# Patient Record
Sex: FEMALE | Race: WHITE | NOT HISPANIC OR LATINO | Employment: OTHER | URBAN - METROPOLITAN AREA
[De-identification: names, ages, dates, MRNs, and addresses within clinical notes are randomized per-mention and may not be internally consistent; named-entity substitution may affect disease eponyms.]

---

## 2018-01-10 ENCOUNTER — HISTORICAL (OUTPATIENT)
Dept: RADIOLOGY | Facility: HOSPITAL | Age: 58
End: 2018-01-10

## 2018-04-02 ENCOUNTER — HISTORICAL (OUTPATIENT)
Dept: LAB | Facility: HOSPITAL | Age: 58
End: 2018-04-02

## 2018-04-06 ENCOUNTER — HISTORICAL (OUTPATIENT)
Dept: SURGERY | Facility: HOSPITAL | Age: 58
End: 2018-04-06

## 2018-06-27 ENCOUNTER — HISTORICAL (OUTPATIENT)
Dept: RADIOLOGY | Facility: HOSPITAL | Age: 58
End: 2018-06-27

## 2018-11-05 ENCOUNTER — HISTORICAL (OUTPATIENT)
Dept: RADIOLOGY | Facility: HOSPITAL | Age: 58
End: 2018-11-05

## 2019-02-07 ENCOUNTER — HISTORICAL (OUTPATIENT)
Dept: RADIOLOGY | Facility: HOSPITAL | Age: 59
End: 2019-02-07

## 2019-09-14 ENCOUNTER — HISTORICAL (OUTPATIENT)
Dept: ADMINISTRATIVE | Facility: HOSPITAL | Age: 59
End: 2019-09-14

## 2019-09-14 LAB
T4 FREE SERPL-MCNC: 0.6 NG/DL (ref 0.76–1.46)
TSH SERPL-ACNC: 8.63 MIU/L (ref 0.36–3.74)

## 2019-10-16 ENCOUNTER — HISTORICAL (OUTPATIENT)
Dept: INTERNAL MEDICINE | Facility: CLINIC | Age: 59
End: 2019-10-16

## 2019-10-16 LAB
ABS NEUT (OLG): 4.46 X10(3)/MCL (ref 2.1–9.2)
ALBUMIN SERPL-MCNC: 3.6 GM/DL (ref 3.4–5)
ALBUMIN/GLOB SERPL: 0.9 RATIO (ref 1.1–2)
ALP SERPL-CCNC: 99 UNIT/L (ref 45–117)
ALT SERPL-CCNC: 40 UNIT/L (ref 12–78)
AST SERPL-CCNC: 18 UNIT/L (ref 15–37)
BASOPHILS # BLD AUTO: 0.1 X10(3)/MCL (ref 0–0.2)
BASOPHILS NFR BLD AUTO: 1 %
BILIRUB SERPL-MCNC: 0.4 MG/DL (ref 0.2–1)
BILIRUBIN DIRECT+TOT PNL SERPL-MCNC: 0.1 MG/DL (ref 0–0.2)
BILIRUBIN DIRECT+TOT PNL SERPL-MCNC: 0.3 MG/DL
BUN SERPL-MCNC: 16 MG/DL (ref 7–18)
CALCIUM SERPL-MCNC: 9.4 MG/DL (ref 8.5–10.1)
CHLORIDE SERPL-SCNC: 108 MMOL/L (ref 98–107)
CHOLEST SERPL-MCNC: 262 MG/DL
CHOLEST/HDLC SERPL: 5 {RATIO} (ref 0–4.4)
CO2 SERPL-SCNC: 29 MMOL/L (ref 21–32)
CREAT SERPL-MCNC: 0.7 MG/DL (ref 0.6–1.3)
EOSINOPHIL # BLD AUTO: 0.1 X10(3)/MCL (ref 0–0.9)
EOSINOPHIL NFR BLD AUTO: 2 %
ERYTHROCYTE [DISTWIDTH] IN BLOOD BY AUTOMATED COUNT: 13.9 % (ref 11.5–14.5)
EST. AVERAGE GLUCOSE BLD GHB EST-MCNC: 120 MG/DL
GLOBULIN SER-MCNC: 3.9 GM/ML (ref 2.3–3.5)
GLUCOSE SERPL-MCNC: 85 MG/DL (ref 74–106)
HBA1C MFR BLD: 5.8 % (ref 4.2–6.3)
HCT VFR BLD AUTO: 48.1 % (ref 35–46)
HDLC SERPL-MCNC: 52 MG/DL (ref 40–59)
HGB BLD-MCNC: 15.3 GM/DL (ref 12–16)
IMM GRANULOCYTES # BLD AUTO: 0.02 10*3/UL
IMM GRANULOCYTES NFR BLD AUTO: 0 %
LDLC SERPL CALC-MCNC: 175 MG/DL
LYMPHOCYTES # BLD AUTO: 2.2 X10(3)/MCL (ref 0.6–4.6)
LYMPHOCYTES NFR BLD AUTO: 29 %
MCH RBC QN AUTO: 32.3 PG (ref 26–34)
MCHC RBC AUTO-ENTMCNC: 31.8 GM/DL (ref 31–37)
MCV RBC AUTO: 101.7 FL (ref 80–100)
MONOCYTES # BLD AUTO: 0.6 X10(3)/MCL (ref 0.1–1.3)
MONOCYTES NFR BLD AUTO: 8 %
NEUTROPHILS # BLD AUTO: 4.46 X10(3)/MCL (ref 2.1–9.2)
NEUTROPHILS NFR BLD AUTO: 60 %
PLATELET # BLD AUTO: 239 X10(3)/MCL (ref 130–400)
PMV BLD AUTO: 10.9 FL (ref 7.4–10.4)
POTASSIUM SERPL-SCNC: 4.4 MMOL/L (ref 3.5–5.1)
PROT SERPL-MCNC: 7.5 GM/DL (ref 6.4–8.2)
RBC # BLD AUTO: 4.73 X10(6)/MCL (ref 4–5.2)
SODIUM SERPL-SCNC: 139 MMOL/L (ref 136–145)
T3FREE SERPL-MCNC: 2.14 PG/ML (ref 2.18–3.98)
T4 FREE SERPL-MCNC: 0.73 NG/DL (ref 0.76–1.46)
TRIGL SERPL-MCNC: 175 MG/DL
TSH SERPL-ACNC: 5.11 MIU/L (ref 0.36–3.74)
VLDLC SERPL CALC-MCNC: 35 MG/DL
WBC # SPEC AUTO: 7.4 X10(3)/MCL (ref 4.5–11)

## 2019-11-11 ENCOUNTER — HISTORICAL (OUTPATIENT)
Dept: RADIOLOGY | Facility: HOSPITAL | Age: 59
End: 2019-11-11

## 2020-01-09 ENCOUNTER — HISTORICAL (OUTPATIENT)
Dept: RADIOLOGY | Facility: HOSPITAL | Age: 60
End: 2020-01-09

## 2020-03-02 ENCOUNTER — HISTORICAL (OUTPATIENT)
Dept: SURGERY | Facility: HOSPITAL | Age: 60
End: 2020-03-02

## 2021-01-14 ENCOUNTER — HISTORICAL (OUTPATIENT)
Dept: ADMINISTRATIVE | Facility: HOSPITAL | Age: 61
End: 2021-01-14

## 2021-01-14 LAB
EST. AVERAGE GLUCOSE BLD GHB EST-MCNC: 119.8 MG/DL
HBA1C MFR BLD: 5.8 %
T4 FREE SERPL-MCNC: 0.94 NG/DL (ref 0.7–1.48)

## 2021-02-05 LAB — HEMOCCULT STL QL IA: NEGATIVE

## 2021-05-26 LAB
HPV16+18+H RISK 12 DNA CVX-IMP: NEGATIVE
PAP RECOMMENDATION EXT: NORMAL
PAP SMEAR: NORMAL

## 2021-06-21 ENCOUNTER — HISTORICAL (OUTPATIENT)
Dept: RADIOLOGY | Facility: HOSPITAL | Age: 61
End: 2021-06-21

## 2021-07-15 ENCOUNTER — HISTORICAL (OUTPATIENT)
Dept: ADMINISTRATIVE | Facility: HOSPITAL | Age: 61
End: 2021-07-15

## 2021-07-15 LAB
ABS NEUT (OLG): 4.23 X10(3)/MCL (ref 2.1–9.2)
ALBUMIN SERPL-MCNC: 3.8 GM/DL (ref 3.4–4.8)
ALBUMIN/GLOB SERPL: 1.1 RATIO (ref 1.1–2)
ALP SERPL-CCNC: 100 UNIT/L (ref 40–150)
ALT SERPL-CCNC: 27 UNIT/L (ref 0–55)
AST SERPL-CCNC: 17 UNIT/L (ref 5–34)
BASOPHILS # BLD AUTO: 0.1 X10(3)/MCL (ref 0–0.2)
BASOPHILS NFR BLD AUTO: 1 %
BILIRUB SERPL-MCNC: 0.6 MG/DL
BILIRUBIN DIRECT+TOT PNL SERPL-MCNC: 0.2 MG/DL (ref 0–0.5)
BILIRUBIN DIRECT+TOT PNL SERPL-MCNC: 0.4 MG/DL (ref 0–0.8)
BUN SERPL-MCNC: 14.9 MG/DL (ref 9.8–20.1)
CALCIUM SERPL-MCNC: 10.2 MG/DL (ref 8.4–10.2)
CHLORIDE SERPL-SCNC: 104 MMOL/L (ref 98–107)
CHOLEST SERPL-MCNC: 217 MG/DL
CHOLEST/HDLC SERPL: 5 {RATIO} (ref 0–5)
CO2 SERPL-SCNC: 29 MMOL/L (ref 23–31)
CREAT SERPL-MCNC: 0.72 MG/DL (ref 0.55–1.02)
EOSINOPHIL # BLD AUTO: 0.1 X10(3)/MCL (ref 0–0.9)
EOSINOPHIL NFR BLD AUTO: 2 %
ERYTHROCYTE [DISTWIDTH] IN BLOOD BY AUTOMATED COUNT: 14.8 % (ref 11.5–14.5)
EST CREAT CLEARANCE SER (OHS): 68.42 ML/MIN
EST. AVERAGE GLUCOSE BLD GHB EST-MCNC: 119.8 MG/DL
GLOBULIN SER-MCNC: 3.5 GM/DL (ref 2.4–3.5)
GLUCOSE SERPL-MCNC: 90 MG/DL (ref 82–115)
HBA1C MFR BLD: 5.8 %
HCT VFR BLD AUTO: 52.6 % (ref 35–46)
HDLC SERPL-MCNC: 45 MG/DL (ref 35–60)
HGB BLD-MCNC: 16.6 GM/DL (ref 12–16)
IMM GRANULOCYTES # BLD AUTO: 0.04 10*3/UL
IMM GRANULOCYTES NFR BLD AUTO: 0 %
LDLC SERPL CALC-MCNC: 139 MG/DL (ref 50–140)
LYMPHOCYTES # BLD AUTO: 2.2 X10(3)/MCL (ref 0.6–4.6)
LYMPHOCYTES NFR BLD AUTO: 29 %
MCH RBC QN AUTO: 31.2 PG (ref 26–34)
MCHC RBC AUTO-ENTMCNC: 31.6 GM/DL (ref 31–37)
MCV RBC AUTO: 98.9 FL (ref 80–100)
MONOCYTES # BLD AUTO: 0.7 X10(3)/MCL (ref 0.1–1.3)
MONOCYTES NFR BLD AUTO: 10 %
NEUTROPHILS # BLD AUTO: 4.23 X10(3)/MCL (ref 2.1–9.2)
NEUTROPHILS NFR BLD AUTO: 58 %
NRBC BLD AUTO-RTO: 0 % (ref 0–0.2)
PLATELET # BLD AUTO: 252 X10(3)/MCL (ref 130–400)
PMV BLD AUTO: 10.7 FL (ref 7.4–10.4)
POTASSIUM SERPL-SCNC: 4.9 MMOL/L (ref 3.5–5.1)
PROT SERPL-MCNC: 7.3 GM/DL (ref 5.8–7.6)
RBC # BLD AUTO: 5.32 X10(6)/MCL (ref 4–5.2)
SODIUM SERPL-SCNC: 139 MMOL/L (ref 136–145)
T4 FREE SERPL-MCNC: 0.8 NG/DL (ref 0.7–1.48)
TRIGL SERPL-MCNC: 166 MG/DL (ref 37–140)
TSH SERPL-ACNC: 2.94 UIU/ML (ref 0.35–4.94)
VLDLC SERPL CALC-MCNC: 33 MG/DL
WBC # SPEC AUTO: 7.3 X10(3)/MCL (ref 4.5–11)

## 2021-12-22 ENCOUNTER — HISTORICAL (OUTPATIENT)
Dept: INTERNAL MEDICINE | Facility: CLINIC | Age: 61
End: 2021-12-22

## 2021-12-22 LAB
ABS NEUT (OLG): 4.94 X10(3)/MCL (ref 2.1–9.2)
ALBUMIN SERPL-MCNC: 3.8 GM/DL (ref 3.4–4.8)
ALBUMIN/GLOB SERPL: 1 RATIO (ref 1.1–2)
ALP SERPL-CCNC: 107 UNIT/L (ref 40–150)
ALT SERPL-CCNC: 39 UNIT/L (ref 0–55)
AST SERPL-CCNC: 22 UNIT/L (ref 5–34)
BASOPHILS # BLD AUTO: 0.1 X10(3)/MCL (ref 0–0.2)
BASOPHILS NFR BLD AUTO: 1 %
BILIRUB SERPL-MCNC: 0.4 MG/DL
BILIRUBIN DIRECT+TOT PNL SERPL-MCNC: 0.1 MG/DL (ref 0–0.5)
BILIRUBIN DIRECT+TOT PNL SERPL-MCNC: 0.3 MG/DL (ref 0–0.8)
BUN SERPL-MCNC: 19.2 MG/DL (ref 9.8–20.1)
CALCIUM SERPL-MCNC: 10.4 MG/DL (ref 8.7–10.5)
CHLORIDE SERPL-SCNC: 103 MMOL/L (ref 98–107)
CHOLEST SERPL-MCNC: 255 MG/DL
CHOLEST/HDLC SERPL: 5 {RATIO} (ref 0–5)
CO2 SERPL-SCNC: 31 MMOL/L (ref 23–31)
CREAT SERPL-MCNC: 0.78 MG/DL (ref 0.55–1.02)
EOSINOPHIL # BLD AUTO: 0.2 X10(3)/MCL (ref 0–0.9)
EOSINOPHIL NFR BLD AUTO: 2 %
ERYTHROCYTE [DISTWIDTH] IN BLOOD BY AUTOMATED COUNT: 14.6 % (ref 11.5–14.5)
GLOBULIN SER-MCNC: 4 GM/DL (ref 2.4–3.5)
GLUCOSE SERPL-MCNC: 97 MG/DL (ref 82–115)
HCT VFR BLD AUTO: 50.7 % (ref 35–46)
HDLC SERPL-MCNC: 55 MG/DL (ref 35–60)
HGB BLD-MCNC: 16.1 GM/DL (ref 12–16)
IMM GRANULOCYTES # BLD AUTO: 0.03 10*3/UL
IMM GRANULOCYTES NFR BLD AUTO: 0 %
LDLC SERPL CALC-MCNC: 170 MG/DL (ref 50–140)
LYMPHOCYTES # BLD AUTO: 2.4 X10(3)/MCL (ref 0.6–4.6)
LYMPHOCYTES NFR BLD AUTO: 28 %
MCH RBC QN AUTO: 32 PG (ref 26–34)
MCHC RBC AUTO-ENTMCNC: 31.8 GM/DL (ref 31–37)
MCV RBC AUTO: 100.8 FL (ref 80–100)
MONOCYTES # BLD AUTO: 0.9 X10(3)/MCL (ref 0.1–1.3)
MONOCYTES NFR BLD AUTO: 11 %
NEUTROPHILS # BLD AUTO: 4.94 X10(3)/MCL (ref 2.1–9.2)
NEUTROPHILS NFR BLD AUTO: 58 %
NRBC BLD AUTO-RTO: 0 % (ref 0–0.2)
PLATELET # BLD AUTO: 260 X10(3)/MCL (ref 130–400)
PMV BLD AUTO: 10.7 FL (ref 7.4–10.4)
POTASSIUM SERPL-SCNC: 5.1 MMOL/L (ref 3.5–5.1)
PROT SERPL-MCNC: 7.8 GM/DL (ref 5.8–7.6)
RBC # BLD AUTO: 5.03 X10(6)/MCL (ref 4–5.2)
SODIUM SERPL-SCNC: 140 MMOL/L (ref 136–145)
TRIGL SERPL-MCNC: 151 MG/DL (ref 37–140)
TSH SERPL-ACNC: 8.84 UIU/ML (ref 0.35–4.94)
VLDLC SERPL CALC-MCNC: 30 MG/DL
WBC # SPEC AUTO: 8.5 X10(3)/MCL (ref 4.5–11)

## 2022-01-13 ENCOUNTER — HISTORICAL (OUTPATIENT)
Dept: RADIOLOGY | Facility: HOSPITAL | Age: 62
End: 2022-01-13

## 2022-04-09 ENCOUNTER — HISTORICAL (OUTPATIENT)
Dept: ADMINISTRATIVE | Facility: HOSPITAL | Age: 62
End: 2022-04-09
Payer: MEDICAID

## 2022-04-26 VITALS
BODY MASS INDEX: 44.04 KG/M2 | DIASTOLIC BLOOD PRESSURE: 80 MMHG | HEIGHT: 64 IN | WEIGHT: 257.94 LBS | OXYGEN SATURATION: 94 % | SYSTOLIC BLOOD PRESSURE: 127 MMHG

## 2022-04-30 NOTE — OP NOTE
DATE OF SURGERY:    04/06/2018    SURGEON:  Pramod Moe MD    PREOPERATIVE DIAGNOSIS:  Left knee lateral meniscus tear.    POSTOPERATIVE DIAGNOSIS:  Left knee lateral meniscus tear.    PROCEDURES:    1. Left knee arthroscopy with lateral meniscectomy.  2. Arthroscopic loose body removal, left knee.    ASSISTANT:  Basilio Ramirez CST    ANESTHESIA:  LMA.    IV FLUIDS:  As per Anesthesia.    ESTIMATED BLOOD LOSS:  Less than 5 mL.    INSTRUMENT COUNT:  Counts are correct.    COMPLICATIONS:  None.    DISPOSITION:  Stable to PACU.    INDICATIONS:  Ms. Weber is a 57-year-old female with continued pain and loss of motion of left knee.  She has failed multiple conservative treatments.  She has been followed in my clinic.  Risks, benefits and alternatives were discussed with the patient in detail.  The risks include but are not limited to pain, bleeding, infection, damage to blood vessels or nerves, heart attack, stroke, death, need for operation, continued pain, continued loss of motion, retear, post-traumatic arthritis, need for additional surgery.  The patient understood these risks and wanted to proceed with surgical intervention.  Informed consent was obtained.    PROCEDURE IN DETAIL:  The patient was found in preoperative holding by Anesthesia and found fit for surgery.  She was taken to the operating room and placed on the operating table in supine position.  All bony prominences were well padded.  Timeout was called to identify correct patient, correct procedure and correct site, and all were in agreement.  The patient underwent LMA anesthesia without complications.  She was then prepped and draped in normal sterile fashion leaving the left lower extremity exposed for surgery.  Well padded tourniquet was placed on the left upper thigh.  Approximate tourniquet time was 20 minutes at 300.  She received preoperative antibiotics.  After exsanguination of the left lower extremity, tourniquet was inflated.   Anterolateral portal was then made through 1 cm incision.  Camera was introduced in suprapatellar pouch.  Next, medial and lateral gutters were inspected with no loose bodies.  Patella was tracking appropriately.  She did have some mild changes underneath the patella.  Camera was introduced in the medial compartment.  Anteromedial portal was made through a 1 cm incision.  Next, probing of medial meniscus demonstrated no obvious tears.  She did have loose bodies in the medial compartment.  She had unstable cartilage of the medial femoral condyle.  She did have some exposed bone.  With the use of 3.5 shaver, the patient underwent arthroscopic loose body removal.  She also had debridement of the unstable cartilage of the medial femoral condyle.  This was taken back to stable cartilage.  After this was done, the camera was introduced in the intercondylar notch, where ACL and PCL were inspected and no additional loose bodies.  ACL and PCL were intact.  Camera was introduced in lateral compartment.  The patient had unstable anterior horn lateral meniscus tear to probing.  With the use of 3.5 shaver, the patient underwent a lateral meniscectomy of the anterior horn and body.  She tolerated this very well.  The remaining lateral meniscus was probed and found to be stable without any signs of additional tear or instability.  She had minimal arthritic change in the lateral compartment.  Camera was introduced back in suprapatellar pouch.  All excess fluid was removed.  Tourniquet was released.  Hemostasis was achieved.  Copious irrigation was used to wash the two incisions.  Incision was then closed with 2-0 nylon suture in standard horizontal mattress configuration.  Xeroform, 4x4s, soft tissue dressing and Ace wrap was placed on the left lower extremity.  She was then awakened by anesthesia and brought to PACU in stable condition.        ______________________________  MD BRITTANIE Galeana/JESSI  DD:  04/11/2018  Time:   04:47PM  DT:  04/12/2018  Time:  12:11PM  Job #:  [id number]

## 2022-05-02 NOTE — HISTORICAL [facility] CERNER
This is a historical note converted from Liliam. Formatting and pictures may have been removed.  Please reference Liliam for original formatting and attached multimedia. Chief Complaint  check up  History of Present Illness  61 yo mo copdarthritis hypothyroidhx edema hx breast ca chol tob use intertrigo  Review of Systems  neg cv, neg pulm, neg gi gu ros as in hpi  Physical Exam  Vitals & Measurements  T:?36.6? ?C (Oral)? HR:?89(Peripheral)? RR:?20? BP:?119/73?  HT:?162.00?cm? WT:?116.400?kg? BMI:?44.35?  aax4 nad  eliu eomi  cv rrr s1s2 no mgr  lungs cta no cr or whz  abd nt soft  ext no edema  neuro intact  Assessment/Plan  1.?Breast cancer?C50.919  ?remission  ?  2.?COPD - Chronic obstructive pulmonary disease?J44.9  ?stable  ?  3.?Hypothyroidism?E03.9  ?tft  ?  4.?Hypertension?I10  ?controlled  Ordered:  CBC w/ Auto Diff, Routine collect, *Est. 01/14/22 3:00:00 CST, Blood, Order for future visit, *Est. Stop date 01/14/22 3:00:00 CST, Lab Collect, Hypertension, 01/14/21 9:05:00 CST  Clinic Follow up, *Est. 07/14/21 3:00:00 CDT, Order for future visit, Hypertension, [facility]  Comprehensive Metabolic Panel, Routine collect, *Est. 01/14/22 3:00:00 CST, Blood, Order for future visit, *Est. Stop date 01/14/22 3:00:00 CST, Lab Collect, Hypertension, 01/14/21 9:05:00 CST  Free T4, Routine collect, 01/14/21 9:05:00 CST, Blood, Order for future visit, Stop date 01/14/21 9:05:00 CST, Lab Collect, Hypertension, 01/14/21 9:05:00 CST  Hemoglobin A1C [facility], Routine collect, 01/14/21 9:05:00 CST, Blood, Order for future visit, Stop date 01/14/21 9:05:00 CST, Lab Collect, Hypertension, 01/14/21 9:05:00 CST  Lipid Panel, Routine collect, *Est. 01/14/22 3:00:00 CST, Blood, Order for future visit, *Est. Stop date 01/14/22 3:00:00 CST, Lab Collect, Hypertension, 01/14/21 9:05:00 CST  Occult Blood Fecal Immunoassay, Routine collect, Stool, Order for future visit, 01/14/21 9:05:00 CST, Stop date 01/14/21 9:05:00 CST, Nurse collect,  Hypertension  Office/Outpatient Visit Level 4 Established [id number] PC, Hypertension, [facility], 01/14/21 9:04:00 CST  Thyroid Stimulating Hormone, Routine collect, *Est. 01/14/22 3:00:00 CST, Blood, Order for future visit, *Est. Stop date 01/14/22 3:00:00 CST, Lab Collect, Hypertension, 01/14/21 9:05:00 CST  ?  5.?Tobacco user?Z72.0  ?advised to quit  ?  Orders:  nicotine, = 1 EA, TD, Daily, # 21 EA, 0 Refill(s), Pharmacy: [facility], 162, cm, Height/Length Dosing, 01/14/21 8:41:00 CST, 116.4, kg, Weight Dosing, 01/14/21 8:41:00 CST  Echocardiogram Complete Adult, *Est. 01/21/21 3:00:00 CST, Routine, Edema, *Est. Stop date 01/21/21 3:00:00 CST, Ambulatory, Patient has IV, Standard Precautions, [facility], Order for future visit, Edema  Referrals  [facility] Internal Referral to Gynecology Clinic, Specialty: Gynecology, Reason: pap, Start: 01/14/21 9:05:00 CST, Service By: 01/15/21  Clinic Follow up, *Est. 07/14/21 3:00:00 CDT, Order for future visit, Hypertension, [facility]   Problem List/Past Medical History  Ongoing  Arthritis  Breast cancer  Claustrophobia  COPD - Chronic obstructive pulmonary disease  History of bronchitis  History of pneumonia  Hypertension  Hypothyroidism  Panic attack  Sleep apnea  Tobacco user  Torn lateral meniscus  Venous insufficiency  Historical  Breast cancer  Cervical dysplasia  Rib injury  Procedure/Surgical History  Biopsy, breast, with placement of breast localization device(s) (eg, clip, metallic pellet), when performed, and imaging of the biopsy specimen, when performed, percutaneous; first lesion, including stereotactic guidance (03/02/2020)  Drainage of Left Breast, Percutaneous Approach, Diagnostic (03/02/2020)  Arthroscopy Knee W/Menisectomy (Left) (04/06/2018)  Arthroscopy, knee, surgical; with meniscectomy (medial OR lateral, including any meniscal shaving) including debridement/shaving of articular cartilage (chondroplasty), same or  separate compartment(s), when performed (04/06/2018)  Excision of Left Knee Joint, Percutaneous Endoscopic Approach (04/06/2018)  Extirpation of Matter from Left Knee Joint, Percutaneous Endoscopic Approach (04/06/2018)  breast reconstruction  Mastectomy  removal of breast implant  vocal cord polyp removed   Medications  Advair Diskus 250 mcg-50 mcg inhalation powder, 1 inh, INH, BID, 11 refills  atorvastatin 20 mg oral tablet, 20 mg= 1 tab(s), Oral, Daily, 11 refills  buprenorphine 5 mcg/hr transdermal film, extended release  celecoxib 200 mg oral capsule, 200 mg= 1 cap(s), Oral, Daily  Chantix,? ?Not taking: Last Dose Date/Time Unknown  Chantix Continuing Month 1 mg oral tablet, 1 mg= 1 tab(s), Oral, BID,? ?Not taking: Last Dose Date/Time unknown  Chantix Starter Pack 0.5 mg-1 mg oral tablet,? ?Not taking: Last Dose Date/Time unknown  cpap mask hose filter headgear, See Instructions  furosemide 40 mg oral tablet, 40 mg= 1 tab(s), Oral, Daily, 11 refills  levothyroxine 50 mcg (0.05 mg) oral tablet, 50 mcg= 1 tab(s), Oral, Daily  levothyroxine 75 mcg (0.075 mg) oral tablet, 75 mcg= 1 tab(s), Oral, Daily, 3 refills  Nicoderm C-Q 14 mg/24 hr transdermal film, extended release, 1 EA, TD, Daily  Nicoderm C-Q Clear 21 mg/24 hr transdermal film, extended release, 1 EA, TD, Daily  Norvasc 2.5 mg oral tablet, 2.5 mg= 1 tab(s), Oral, Daily, 3 refills  ProAir HFA 90 mcg/inh inhalation aerosol with adapter, 2 puff(s), INH, q6hr, PRN, 11 refills  Spiriva HandiHaler 18 mcg inhalation capsule, 18 mcg= 1 cap(s), INH, Daily, 11 refills  traMADol 50 mg oral tablet, 50 mg= 1 tab(s), Oral, q8hr, PRN  Allergies  Demerol HCl?(nausea)  Dilaudid?(sob, smothering sensation)  Percodan?(palpitations)  codeine?(unknown, childhood)  methadone?(sob, tachycardia, methadone)  morphine?(sob, tachycardia, morphine)  sulfamethoxazole?(unknown, childhood allergy)  Social History  Abuse/Neglect  No, No, Yes, 01/14/2021  Alcohol  Current, Beer,  04/03/2018  Employment/School  Employed, 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 Living situation: Home/Independent., 08/21/2015  Sexual  Sexually active: No., 08/21/2015  Substance Use  Never, 08/21/2015  Tobacco  10 or more cigarettes (1/2 pack or more)/day in last 30 days, Yes, 01/14/2021  Family History  CAD (coronary artery disease): Father.  Diabetes mellitus type 2: Mother and Father.  Heart attack: Father.  Hypertension.: Mother, Father and Brother.  Primary malignant neoplasm of lung: Father.  Renal disease: Mother.  Stroke: Grandfather.  Venous insufficiency: Father.  Immunizations  Vaccine Date Status   pneumococcal 23-polyvalent vaccine 10/21/2017 Given   Health Maintenance  Health Maintenance  ???Pending?(in the next year)  ??? ??OverDue  ??? ? ? ?Aspirin Therapy for CVD Prevention due??10/25/19??and every 1??year(s)  ??? ? ? ?COPD Management-Oxygen Assessment due??08/15/20??and every 1??year(s)  ??? ? ? ?Influenza Vaccine due??10/01/20??and every 1??day(s)  ??? ? ? ?Colorectal Screening due??10/15/20??and every 1??year(s)  ??? ? ? ?Diabetes Screening due??10/15/20??and every 1??year(s)  ??? ? ? ?Hypertension Management-BMP due??10/15/20??and every 1??year(s)  ??? ? ? ?Smoking Cessation due??01/01/21??and every 1??year(s)  ??? ??Due?  ??? ? ? ?Alcohol Misuse Screening due??01/02/21??and every 1??year(s)  ??? ? ? ?Cervical Cancer Screening due??01/14/21??and every 3??year(s)  ??? ? ? ?COPD Maintenance-Pulmonary Rehab Education due??01/14/21??and every 1??year(s)  ??? ? ? ?COPD Maintenance-Spirometry due??01/14/21??Unknown Frequency  ??? ? ? ?Hypertension Management-Education due??01/14/21??and every 1??year(s)  ??? ? ? ?Lung Cancer Screening due??01/14/21??and every 1??year(s)  ??? ? ? ?Tetanus Vaccine due??01/14/21??and every 10??year(s)  ??? ? ? ?Zoster Vaccine due??01/14/21??Unknown Frequency  ??? ??Due In Future?  ??? ? ? ?COPD Management-COPD Medications  Prescribed not due until??05/28/21??and every 1??year(s)  ??? ? ? ?Obesity Screening not due until??01/01/22??and every 1??year(s)  ??? ? ? ?Breast Cancer Screening not due until??01/08/22??and every 2??year(s)  ???Satisfied?(in the past 1 year)  ??? ??Satisfied?  ??? ? ? ?ADL Screening on??01/14/21.??Satisfied by Barrientos LPDERECK, Pravin Aerial  ??? ? ? ?Blood Pressure Screening on??01/14/21.??Satisfied by Barrientos LPDERECK, Pravin Aerial  ??? ? ? ?Body Mass Index Check on??01/14/21.??Satisfied by Barrientos LPDERECK, Pravin Aerial  ??? ? ? ?COPD Management-COPD Medications Prescribed on??05/28/20.??Satisfied by Noemí Schmitt MD  ??? ? ? ?Depression Screening on??01/14/21.??Satisfied by Barrientos LPDERECK, Pravin Aerial  ??? ? ? ?Hypertension Management-Blood Pressure on??01/14/21.??Satisfied by Barrientos LPDERECK, Pravin Aerial  ??? ? ? ?Influenza Vaccine on??01/14/21.??Satisfied by Barrientos LPDERECK, Pravin Aerial  ??? ? ? ?Obesity Screening on??01/14/21.??Satisfied by Barrientos LPN, Pravin Aerial  ?

## 2022-05-02 NOTE — HISTORICAL [facility] CERNER
This is a historical note converted from Liliam. Formatting and pictures may have been removed.  Please reference Liliam for original formatting and attached multimedia. Chief Complaint  Medication Management  History of Present Illness  59-year-old female presents to Urgent care requesting multiple medication refills for Hypertension, COPD, Hypothyroidism. ?Patient was last seen in urgent care back in May?for same request; patient did not keep?appointment with internal medicine this summer. Patient is rescheduled to establish primary care?with internal medicine on?10/15/19. Patient is requesting refills for Norvasc, Advair, Pro air, levothyroxine, and Lasix. ?She states she?has been out of most of these medications?for over a month. However, looking at Dr Diaz she only received 30 days of levothyroxine in May which means she has been out of this medication for much longer.  ?  Increased pitting edema to BLE x 2 days. No increased shortness of breath; no chest pain.  Patient is a known smoker. Patient is scheduled with [facility] on 10/15/19 to establish PCP.  Review of Systems  GENERAL: Negative except as stated?in HPI  CV: Negative except as stated in HPI  RESP: Negative except as stated?in HPI  GI: Negative?except as stated in?HPI  : Negative?except as stated in?HPI  SKIN: Negative?except as stated in?HPI  Neuro: Negative?except as stated in?HPI  MS: Negative?except as stated in?HPI  Psych: Negative?except as stated in?HPI  Physical Exam  Vitals & Measurements  T:?36.7? ?C (Oral)? HR:?82(Peripheral)? BP:?126/87?  HT:?163?cm? WT:?86.1?kg? BMI:?32.41?  Vital Signs reviewed  GENERAL: Alert and oriented; no acute distress. Obese.  Head: Normocephalic, atraumatic.  Neck: Supple, nontender.? Full ROM. No carotid bruits or JVD. No palpable adenopathy. No thyroid enlargement or nodules noted.  CV: Normal rate and rhythm. No murmurs, rubs or gallops. ?No JVD or carotid bruit noted. No pitting edema to extremities or calf  tenderness. Normal peripheral pulses and perfusion.  RESP: Respirations even and non labored. ?Expiratory wheezing and rhonchi noted?throughout lung fields; delayed expiratory phase noted.  NEURO: Awake, alert and oriented to person, place, time and situation. Cranial nerves II-XII grossly intact.?No focal or sensory deficits noted.  INTEGUMENTARY: Skin warm, dry, and intact. No rashes or?unusual bruising; no pallor.  PSYCH: Appropriate mood and affect; cooperative.  Assessment/Plan  1.?Hypertension?I10,?Hypertension?I10  Refilled Norvasc?as previously prescribed. ?Keep scheduled appointment with internal medicine in October. ?Low-sodium diet.?ER precautions.  Ordered:  amLODIPine, 2.5 mg = 1 tab(s), Oral, Daily, # 30 tab(s), 1 Refill(s), Pharmacy: [facility]  fluticasone-salmeterol, 1 inh, INH, BID, rinse mouth and throat after use, # 60 cap(s), 1 Refill(s), Pharmacy: [facility]  After Hrs Visit [id number] PC, Hypertension  Hypothyroidism  COPD - Chronic obstructive pulmonary disease  Tobacco user, 09/14/19 11:18:00 CDT  Office/Outpatient Visit Level 4 Established [id number] PC, Hypertension  Hypothyroidism  COPD - Chronic obstructive pulmonary disease  Tobacco user, 09/14/19 11:18:00 CDT  ?  2.?Hypothyroidism?E03.9,?Hypothyroidism?E03.9  TSH and Free T4?collected for baseline only. ?Stressed the importance?of medication compliance with patient.?Stressed the importance of taking Synthroid daily on an empty stomach with a full glass of water; avoid taking other medications at the same time. ?Keep scheduled appointment with internal medicine clinic in October. ?Follow-up as needed; ER precautions discussed.  Ordered:  fluticasone-salmeterol, 1 inh, INH, BID, rinse mouth and throat after use, # 60 cap(s), 1 Refill(s), Pharmacy: [facility]  After Hrs Visit [id number] PC, Hypertension  Hypothyroidism  COPD - Chronic obstructive pulmonary disease  Tobacco user, 09/14/19 11:18:00 CDT  Free  T4, Routine collect, 09/14/19 11:20:00 CDT, Blood, Collected, Stop date 09/14/19 11:20:00 CDT, Nurse collect, Hypothyroidism, 09/14/19 11:20:00 CDT  Office/Outpatient Visit Level 4 Established [id number] PC, Hypertension  Hypothyroidism  COPD - Chronic obstructive pulmonary disease  Tobacco user, 09/14/19 11:18:00 CDT  Thyroid Stimulating Hormone, Routine collect, 09/14/19 11:20:00 CDT, Blood, Collected, Stop date 09/14/19 11:20:00 CDT, Nurse collect, Hypothyroidism, 09/14/19 11:20:00 CDT  ?  3.?COPD - Chronic obstructive pulmonary disease?J44.9,?COPD - Chronic obstructive pulmonary disease?J44.9  Refilled Advair and pro-air as previously prescribed. ?I stressed the importance of smoking cessation. ?Keep appointment with internal medicine clinic in October as scheduled. ?ER precautions for any respiratory distress or worsening in condition.  Ordered:  fluticasone-salmeterol, 1 inh, INH, BID, rinse mouth and throat after use, # 60 cap(s), 1 Refill(s), Pharmacy: [facility]  After Hrs Visit [id number] PC, Hypertension  Hypothyroidism  COPD - Chronic obstructive pulmonary disease  Tobacco user, 09/14/19 11:18:00 CDT  Office/Outpatient Visit Level 4 Established [id number] PC, Hypertension  Hypothyroidism  COPD - Chronic obstructive pulmonary disease  Tobacco user, 09/14/19 11:18:00 CDT  ?  4.?Tobacco user?Z72.0  Smoking cessation education at discharge.  Ordered:  After Hrs Visit [id number] PC, Hypertension  Hypothyroidism  COPD - Chronic obstructive pulmonary disease  Tobacco user, 09/14/19 11:18:00 CDT  Office/Outpatient Visit Level 4 Established [id number] PC, Hypertension  Hypothyroidism  COPD - Chronic obstructive pulmonary disease  Tobacco user, 09/14/19 11:18:00 CDT  ?  Orders:  furosemide, 40 mg = 1 tab(s), Oral, Daily, every morning, # 30 tab(s), 0 Refill(s), Pharmacy: [facility]  levothyroxine, 50 mcg = 1 tab(s), Oral, Daily, on empty stomach with glass of water., # 30 tab(s), 0 Refill(s),  Pharmacy: [facility]  Routine Spec Collect Venipuncture - Lab blood collect, 09/14/19 11:22:00 CDT, 09/14/19 11:22:00 CDT   Problem List/Past Medical History  Ongoing  Arthritis  Breast cancer  Claustrophobia  COPD - Chronic obstructive pulmonary disease  History of bronchitis  History of pneumonia  Hypertension  Hypothyroidism  Panic attack  Sleep apnea  Tobacco user  Torn lateral meniscus  Venous insufficiency  Historical  Breast cancer  Cervical dysplasia  Rib injury  Procedure/Surgical History  Arthroscopy Knee W/Menisectomy (Left) (04/06/2018)  Arthroscopy, knee, surgical; with meniscectomy (medial OR lateral, including any meniscal shaving) including debridement/shaving of articular cartilage (chondroplasty), same or separate compartment(s), when performed (04/06/2018)  Excision of Left Knee Joint, Percutaneous Endoscopic Approach (04/06/2018)  Extirpation of Matter from Left Knee Joint, Percutaneous Endoscopic Approach (04/06/2018)  breast reconstruction  Mastectomy  removal of breast implant  vocal cord polyp removed   Medications  Advair Diskus 250 mcg-50 mcg inhalation powder, 1 inh, INH, BID, 1 refills  buprenorphine 5 mcg/hr transdermal film, extended release  celecoxib 200 mg oral capsule, 200 mg= 1 cap(s), Oral, Daily  Chantix  furosemide 40 mg oral tablet, 40 mg= 1 tab(s), Oral, Daily  levothyroxine 50 mcg (0.05 mg) oral tablet, 50 mcg= 1 tab(s), Oral, Daily  Norvasc 2.5 mg oral tablet, 2.5 mg= 1 tab(s), Oral, Daily, 1 refills  ProAir HFA 90 mcg/inh inhalation aerosol with adapter, 2 puff(s), INH, q6hr, PRN  traMADol 50 mg oral tablet, 50 mg= 1 tab(s), Oral, q8hr, PRN  Allergies  Demerol HCl?(nausea)  Dilaudid?(sob, smothering sensation)  Percodan?(palpitations)  codeine?(unknown, childhood)  methadone?(sob, tachycardia, methadone)  morphine?(sob, tachycardia, morphine)  sulfamethoxazole?(unknown, childhood allergy)  Social History  Abuse/Neglect  No, 09/14/2019  Alcohol  Current, Beer,  04/03/2018  Employment/School  Employed, 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 Living situation: Home/Independent., 08/21/2015  Sexual  Sexually active: No., 08/21/2015  Substance Use  Never, 08/21/2015  Tobacco  10 or more cigarettes (1/2 pack or more)/day in last 30 days, No, 09/14/2019  Family History  CAD (coronary artery disease): Father.  Diabetes mellitus type 2: Mother and Father.  Heart attack: Father.  Hypertension.: Mother, Father and Brother.  Primary malignant neoplasm of lung: Father.  Renal disease: Mother.  Stroke: Grandfather.  Venous insufficiency: Father.  Immunizations  Vaccine Date Status   pneumococcal 23-polyvalent vaccine 10/21/2017 Given   Health Maintenance  Health Maintenance  ???Pending?(in the next year)  ??? ??OverDue  ??? ? ? ?Diabetes Screening due??and every?  ??? ? ? ?Cervical Cancer Screening due??01/31/10??and every 3??year(s)  ??? ? ? ?Alcohol Misuse Screening due??01/01/19??and every 1??year(s)  ??? ? ? ?Smoking Cessation due??01/01/19??and every 1??year(s)  ??? ??Due?  ??? ? ? ?Breast Cancer Screening due??09/14/19??and every?  ??? ? ? ?COPD Maintenance-Pulmonary Rehab Education due??09/14/19??and every 1??year(s)  ??? ? ? ?COPD Maintenance-Spirometry due??09/14/19??and every?  ??? ? ? ?Colorectal Screening due??09/14/19??and every?  ??? ? ? ?Hypertension Management-Education due??09/14/19??and every 1??year(s)  ??? ? ? ?Influenza Vaccine due??09/14/19??and every?  ??? ? ? ?Lung Cancer Screening due??09/14/19??and every 1??year(s)  ??? ? ? ?Tetanus Vaccine due??09/14/19??and every 10??year(s)  ??? ? ? ?Zoster Vaccine due??09/14/19??and every 100??year(s)  ??? ??Due In Future?  ??? ? ? ?Hypertension Management-BMP not due until??10/25/19??and every 1??year(s)  ??? ? ? ?Aspirin Therapy for CVD Prevention not due until??10/25/19??and every 1??year(s)  ??? ? ? ?Depression Screening not due until??12/11/19??and every 1??year(s)  ??? ? ?  ?Obesity Screening not due until??01/01/20??and every 1??year(s)  ??? ? ? ?ADL Screening not due until??05/10/20??and every 1??year(s)  ??? ? ? ?COPD Management-Oxygen Assessment not due until??08/15/20??and every 1??year(s)  ??? ? ? ?Blood Pressure Screening not due until??09/13/20??and every 1??year(s)  ??? ? ? ?Body Mass Index Check not due until??09/13/20??and every 1??year(s)  ??? ? ? ?Hypertension Management-Blood Pressure not due until??09/13/20??and every 1??year(s)  ???Satisfied?(in the past 1 year)  ??? ??Satisfied?  ??? ? ? ?ADL Screening on??05/10/19.??Satisfied by Brijesh Woody LPN  ??? ? ? ?Aspirin Therapy for CVD Prevention on??10/25/18.??Satisfied by Joshua Azar MD  ??? ? ? ?Blood Pressure Screening on??09/14/19.??Satisfied by Brijesh Woody LPN  ??? ? ? ?Body Mass Index Check on??09/14/19.??Satisfied by Brijesh Woody LPN  ??? ? ? ?COPD Management-COPD Medications Prescribed on??09/14/19.??Satisfied by Blanka Bowles  ??? ? ? ?COPD Management-Oxygen Assessment on??08/15/19.??Satisfied by Yun Lazar RN  ??? ? ? ?Depression Screening on??12/11/18.??Satisfied by Daily Nicholson LPN  ??? ? ? ?Diabetes Screening on??10/25/18.??Satisfied by Nhung Cadet  ??? ? ? ?Hypertension Management-Blood Pressure on??09/14/19.??Satisfied by Brijesh Woody LPN  ??? ? ? ?Hypertension Management-BMP on??10/25/18.??Satisfied by Joffrion, Nhung M  ??? ? ? ?Influenza Vaccine on??12/11/18.??Satisfied by Daily Nicholson LPN  ??? ? ? ?Obesity Screening on??09/14/19.??Satisfied by Brijesh Woody LPN  ?

## 2022-05-02 NOTE — HISTORICAL [facility] CERNER
This is a historical note converted from Liliam. Formatting and pictures may have been removed.  Please reference Liliam for original formatting and attached multimedia. Chief Complaint  knees hurt not taking norvasc  History of Present Illness  62 yo f with mo sees gyn copd jt pain hypothyroid hx edema hx breast ca choltob use hx intertrigo  Review of Systems  neg cv sob due to copd/asthma neg gi gu ros as in hpi  Physical Exam  Vitals & Measurements  T:?36.8? ?C (Oral)? HR:?83(Peripheral)? RR:?18? BP:?127/80?  HT:?162.00?cm? WT:?117.000?kg? BMI:?44.58?  aax4 nad  eliu eomi  cv rrr s1s2 no mgr  lungs cta no cr or whz  abd nt soft  ext no edema  neuro intact  Assessment/Plan  1.?Breast cancer?C50.919  ?remission  ?  2.?COPD - Chronic obstructive pulmonary disease?J44.9  ?doing well on current meds  ?  3.?Hypertension?I10  ?controlled on current meds  Ordered:  CBC w/ Auto Diff, Routine collect, *Est. 07/15/21 3:00:00 CDT, Blood, Order for future visit, *Est. Stop date 07/15/21 3:00:00 CDT, Lab Collect, Hypertension, 07/15/21 10:55:00 CDT  Clinic Follow up, *Est. 01/15/22 3:00:00 CST, Order for future visit, Hypertension, [facility]  Comprehensive Metabolic Panel, Routine collect, *Est. 07/15/21 3:00:00 CDT, Blood, Order for future visit, *Est. Stop date 07/15/21 3:00:00 CDT, Lab Collect, Hypertension, 07/15/21 10:55:00 CDT  Free T4, Routine collect, *Est. 07/15/21 3:00:00 CDT, Blood, Order for future visit, *Est. Stop date 07/15/21 3:00:00 CDT, Lab Collect, Hypertension, 07/15/21 10:55:00 CDT  Hemoglobin A1C [facility], Routine collect, *Est. 07/15/21 3:00:00 CDT, Blood, Order for future visit, *Est. Stop date 07/15/21 3:00:00 CDT, Lab Collect, Hypertension, 07/15/21 10:55:00 CDT  Lipid Panel, Routine collect, *Est. 07/15/21 3:00:00 CDT, Blood, Order for future visit, *Est. Stop date 07/15/21 3:00:00 CDT, Lab Collect, Hypertension, 07/15/21 10:55:00 CDT  Office/Outpatient Visit Level 4 Established [id number] PC,  Hypertension, 07/15/21 10:55:00 CDT  Thyroid Stimulating Hormone, Routine collect, *Est. 07/15/21 3:00:00 CDT, Blood, Order for future visit, *Est. Stop date 07/15/21 3:00:00 CDT, Lab Collect, Hypertension, 07/15/21 10:55:00 CDT  ?  4.?Hypothyroidism?E03.9  ?tft  ?  5.?Tobacco user?Z72.0  ?advised to quit  ?  6.?Sleep apnea?G47.30  ?on cpap  ?  Referrals  Clinic Follow up, *Est. 01/15/22 3:00:00 CST, Order for future visit, Hypertension, [facility]   Problem List/Past Medical History  Ongoing  Arthritis  Breast cancer  Claustrophobia  COPD - Chronic obstructive pulmonary disease  History of bronchitis  History of pneumonia  Hypertension  Hypothyroidism  Panic attack  Sleep apnea  Tobacco user  Torn lateral meniscus  Venous insufficiency  Historical  Breast cancer  Cervical dysplasia  Pregnant  Rib injury  Procedure/Surgical History  Biopsy, breast, with placement of breast localization device(s) (eg, clip, metallic pellet), when performed, and imaging of the biopsy specimen, when performed, percutaneous; first lesion, including stereotactic guidance (03/02/2020)  Drainage of Left Breast, Percutaneous Approach, Diagnostic (03/02/2020)  Arthroscopy Knee W/Menisectomy (Left) (04/06/2018)  Arthroscopy, knee, surgical; with meniscectomy (medial OR lateral, including any meniscal shaving) including debridement/shaving of articular cartilage (chondroplasty), same or separate compartment(s), when performed (04/06/2018)  Excision of Left Knee Joint, Percutaneous Endoscopic Approach (04/06/2018)  Extirpation of Matter from Left Knee Joint, Percutaneous Endoscopic Approach (04/06/2018)  breast reconstruction  Mastectomy  removal of breast implant  vocal cord polyp removed   Medications  Advair Diskus 250 mcg-50 mcg inhalation powder, 1 inh, INH, BID, 11 refills  atorvastatin 20 mg oral tablet, 20 mg= 1 tab(s), Oral, Daily, 11 refills  buprenorphine 5 mcg/hr transdermal film, extended release  celecoxib 200 mg  oral capsule, 200 mg= 1 cap(s), Oral, Daily  Chantix Continuing Month 1 mg oral tablet, 1 mg= 1 tab(s), Oral, BID,? ?Not taking: Last Dose Date/Time unknown  cpap mask hose filter headgear, See Instructions  furosemide 40 mg oral tablet, 40 mg= 1 tab(s), Oral, Daily, 11 refills  levothyroxine 75 mcg (0.075 mg) oral tablet, 75 mcg= 1 tab(s), Oral, Daily, 3 refills  Misc Prescription, See Instructions  Norvasc 2.5 mg oral tablet, 2.5 mg= 1 tab(s), Oral, Daily, 3 refills,? ?Not taking: Last Dose Date/Time Unknown  ProAir HFA 90 mcg/inh inhalation aerosol with adapter, 2 puff(s), INH, q6hr, PRN, 11 refills  Spiriva HandiHaler 18 mcg inhalation capsule, 18 mcg= 1 cap(s), INH, Daily, 11 refills  traMADol 50 mg oral tablet, 50 mg= 1 tab(s), Oral, q8hr, PRN  Allergies  Demerol HCl?(nausea)  Dilaudid?(sob, smothering sensation)  Percodan?(palpitations)  codeine?(unknown, childhood)  methadone?(sob, tachycardia, methadone)  morphine?(sob, tachycardia, morphine)  sulfamethoxazole?(unknown, childhood allergy)  Social History  Abuse/Neglect  No, No, Yes, 07/15/2021  Alcohol  Current, Beer, 04/03/2018  Employment/School  Employed, 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 Living situation: Home/Independent., 08/21/2015    Never in , 07/15/2021  Nutrition/Health  Regular, Good, 05/26/2021  Sexual  Sexually active: No., 08/21/2015  Substance Use  Never, 08/21/2015  Tobacco  10 or more cigarettes (1/2 pack or more)/day in last 30 days, Yes, 07/15/2021  Family History  CAD (coronary artery disease): Father.  Diabetes mellitus type 2: Mother and Father.  Heart attack: Father.  Hypertension.: Mother, Father and Brother.  Primary malignant neoplasm of breast: Aunt.  Primary malignant neoplasm of lung: Father.  Renal disease: Mother.  Stroke: Grandfather.  Venous insufficiency: Father.  Immunizations  Vaccine Date Status   pneumococcal 23-polyvalent vaccine 10/21/2017  Given   Health Maintenance  Health Maintenance  ???Pending?(in the next year)  ??? ??OverDue  ??? ? ? ?Aspirin Therapy for CVD Prevention due??10/25/19??and every 1??year(s)  ??? ? ? ?Influenza Vaccine due??10/01/20??and every 1??day(s)  ??? ? ? ?Hypertension Management-BMP due??10/15/20??and every 1??year(s)  ??? ? ? ?Smoking Cessation due??01/01/21??and every 1??year(s)  ??? ? ? ?Alcohol Misuse Screening due??01/02/21??and every 1??year(s)  ??? ? ? ?COPD Management-COPD Medications Prescribed due??05/28/21??and every 1??year(s)  ??? ??Due?  ??? ? ? ?COPD Maintenance-Pulmonary Rehab Education due??07/15/21??and every 1??year(s)  ??? ? ? ?COPD Maintenance-Spirometry due??07/15/21??Unknown Frequency  ??? ? ? ?Hypertension Management-Education due??07/15/21??and every 1??year(s)  ??? ? ? ?Lung Cancer Screening due??07/15/21??and every 1??year(s)  ??? ? ? ?Tetanus Vaccine due??07/15/21??and every 10??year(s)  ??? ? ? ?Zoster Vaccine due??07/15/21??Unknown Frequency  ??? ??Due In Future?  ??? ? ? ?Obesity Screening not due until??01/01/22??and every 1??year(s)  ??? ? ? ?Diabetes Screening not due until??01/14/22??and every 1??year(s)  ??? ? ? ?Colorectal Screening not due until??02/05/22??and every 1??year(s)  ??? ? ? ?COPD Management-Oxygen Assessment not due until??05/26/22??and every 1??year(s)  ???Satisfied?(in the past 1 year)  ??? ??Satisfied?  ??? ? ? ?ADL Screening on??07/15/21.??Satisfied by Pravin Barrientos LPN [employer]  ??? ? ? ?Blood Pressure Screening on??07/15/21.??Satisfied by Pravin Barrientos LPN [employer]  ??? ? ? ?Body Mass Index Check on??07/15/21.??Satisfied by Pravin Barrientos LPN [employer]  ??? ? ? ?Breast Cancer Screening on??06/21/21.??Satisfied by Ruba Jefferson  ??? ? ? ?COPD Management-Oxygen Assessment on??05/26/21.??Satisfied by Leann Garcia LPN  ??? ? ? ?Cervical Cancer Screening on??05/26/21.??Satisfied by Leela Cornell  ??? ? ? ?Colorectal Screening on??02/05/21.??Satisfied by [place]  [facility]  ??? ? ? ?Depression Screening on??07/15/21.??Satisfied by [employer], [employer]  ??? ? ? ?Diabetes Screening on??01/14/21.??Satisfied by Connie Bourne  ??? ? ? ?Hypertension Management-Blood Pressure on??07/15/21.??Satisfied by [employer], Pravin Aerial  ??? ? ? ?Influenza Vaccine on??01/14/21.??Satisfied by Barrientos LPDERECK, Pravin Aerial  ??? ? ? ?Obesity Screening on??07/15/21.??Satisfied by Barrientos LPDERECK, Pravin Aerial  ?

## 2022-08-17 ENCOUNTER — CLINICAL SUPPORT (OUTPATIENT)
Dept: INTERNAL MEDICINE | Facility: CLINIC | Age: 62
End: 2022-08-17
Payer: MEDICAID

## 2022-08-17 DIAGNOSIS — E78.5 HYPERLIPIDEMIA, UNSPECIFIED HYPERLIPIDEMIA TYPE: ICD-10-CM

## 2022-08-17 DIAGNOSIS — E03.9 HYPOTHYROIDISM, UNSPECIFIED TYPE: ICD-10-CM

## 2022-08-17 DIAGNOSIS — Z72.0 TOBACCO USE: ICD-10-CM

## 2022-08-17 DIAGNOSIS — J44.9 CHRONIC OBSTRUCTIVE PULMONARY DISEASE, UNSPECIFIED COPD TYPE: Primary | ICD-10-CM

## 2022-08-17 DIAGNOSIS — Z12.39 ENCOUNTER FOR SCREENING FOR MALIGNANT NEOPLASM OF BREAST, UNSPECIFIED SCREENING MODALITY: ICD-10-CM

## 2022-08-17 DIAGNOSIS — I10 HYPERTENSION, UNSPECIFIED TYPE: ICD-10-CM

## 2022-08-17 RX ORDER — ALBUTEROL SULFATE 90 UG/1
2 AEROSOL, METERED RESPIRATORY (INHALATION)
Qty: 18 G | Refills: 3 | Status: SHIPPED | OUTPATIENT
Start: 2022-08-17 | End: 2022-11-09

## 2022-08-17 RX ORDER — DICLOFENAC SODIUM 10 MG/G
0.5 GEL TOPICAL 4 TIMES DAILY PRN
COMMUNITY
Start: 2022-05-02 | End: 2023-03-01

## 2022-08-17 RX ORDER — LEVOTHYROXINE SODIUM 88 UG/1
88 TABLET ORAL DAILY
COMMUNITY
Start: 2022-06-28 | End: 2022-11-09 | Stop reason: SDUPTHER

## 2022-08-17 RX ORDER — FUROSEMIDE 40 MG/1
40 TABLET ORAL DAILY
COMMUNITY
Start: 2022-01-03

## 2022-08-17 RX ORDER — ALBUTEROL SULFATE 90 UG/1
2 AEROSOL, METERED RESPIRATORY (INHALATION)
COMMUNITY
Start: 2022-01-03 | End: 2022-08-17 | Stop reason: SDUPTHER

## 2022-08-17 RX ORDER — BUPRENORPHINE 5 UG/H
1 PATCH TRANSDERMAL WEEKLY
COMMUNITY
Start: 2022-07-06

## 2022-08-17 NOTE — PROGRESS NOTES
Established Patient - Audio Only Telehealth Visit     The patient location is: Home  The chief complaint leading to consultation is: Follow up   Visit type: Virtual visit with audio only (telephone)  Total time spent with patient: 33 minutes       The reason for the audio only service rather than synchronous audio and video virtual visit was related to technical difficulties or patient preference/necessity.     Each patient to whom I provide medical services by telemedicine is:  (1) informed of the relationship between the physician and patient and the respective role of any other health care provider with respect to management of the patient; and (2) notified that they may decline to receive medical services by telemedicine and may withdraw from such care at any time. Patient verbally consented to receive this service via voice-only telephone call.       HPI: This is a 62 year old female with a past medical history of COPD, hypertension, hypothyroidism, tobacco use, sleep apnea, hyperlipidemia who presents on 8/17/22  via telemedicine visit for follow up of chronic medical conditions. Last appointment was in 1/2022 which was also a telemedicine. She has not yet had F2F visit w/ me yet as she was previously patient of Dr. Noemí Schmitt  Patient states that she has no complaints today aside from chronic knee pain for which she follows with a pain specialist through which all of her painmanagement is addressed. Labs done on 12/2021 showed TSH level of 8.8372. Her levothyroxine dose was increased at that time from 75 mcg to 88mcg daily. She has been taking this with good compliance. Plan was to recheck TSH in 6 weeks but she has not yet had the opportunity to go to lab and submit bloodwork. I have reordered labs to recheck TSH; she states she should be able to submit this tomorrow. Patient has no issues today but would like to discuss anxiety and possible referral to behavioral health on her next F2F visit. She is due  for flu, tetanus, zoster and covid vaccines; will order these on next face to face appointment. She is also due for lung cancer screening, will order this today. All question answered, Requesting refills today    Physical Exam: General: Alert and oriented, no acute distress  HEENT: Able to hear telephonic visit at a normal tone of voice  Respiratory: Able to speak in full sentences, no coughing  Neurologic: No issues with articulation  Psych: Calm, cooperative       Assessment and plan:       1. Hypothyroidism  -TSH elevated at 8.8 on 12/2021  -Levothyroxine was increased to 88mcg  -recheck TSH not yet done, reordered today    2. Hypertension- controlled  -Per previous office clinic note of Dr. Schmitt, BP was controlled  -it appears that Norvasc was stopped on last visit w/ Dr. Schmitt (patient no longer taking, no longer on med rec)  -advised BP log at home  -will check BP on next F2F visit    3. COPD-  -controlled at this time  continue home advair, spiriva, albuterol prn    4. Tobacco Use  -Smoking cessation advised  -lung cancer screening UTD     5. Hyperlipidemia  -continue home atorvastatin    Health Maintenance/ Wellness  Pneumococcal vaccine: UTD  TDAP: Will administer at next visit  Influenza vaccine: administer at next visit  Mammogram: Last done on 6/2021_, results benign_(BIRADS 2), will reorder  Screening colonoscopy FIT negative in 2/2021, will provide FIT kit on next visit  Lung Cancer Screening: ordered today  Hepatitis Panel: Ordered      Counseling:    - Patient counselled on smoking cessation  - Educated on diet (portion control) and exercise (at least 30 minutes per day)  - Relevant educational materials provided      Medications: reconciled, discussed and refills given.  RTC in 3 months for F2F visit                        This service was not originating from a related E/M service provided within the previous 7 days nor will  to an E/M service or procedure within the next 24 hours or my  soonest available appointment.  Prevailing standard of care was able to be met in this audio-only visit.

## 2022-08-18 ENCOUNTER — LAB VISIT (OUTPATIENT)
Dept: LAB | Facility: HOSPITAL | Age: 62
End: 2022-08-18
Attending: STUDENT IN AN ORGANIZED HEALTH CARE EDUCATION/TRAINING PROGRAM
Payer: MEDICAID

## 2022-08-18 DIAGNOSIS — Z12.39 ENCOUNTER FOR SCREENING FOR MALIGNANT NEOPLASM OF BREAST, UNSPECIFIED SCREENING MODALITY: ICD-10-CM

## 2022-08-18 DIAGNOSIS — J44.9 CHRONIC OBSTRUCTIVE PULMONARY DISEASE, UNSPECIFIED COPD TYPE: ICD-10-CM

## 2022-08-18 LAB
ALBUMIN SERPL-MCNC: 3.6 GM/DL (ref 3.4–4.8)
ALBUMIN/GLOB SERPL: 1 RATIO (ref 1.1–2)
ALP SERPL-CCNC: 111 UNIT/L (ref 40–150)
ALT SERPL-CCNC: 27 UNIT/L (ref 0–55)
AST SERPL-CCNC: 16 UNIT/L (ref 5–34)
BASOPHILS # BLD AUTO: 0.08 X10(3)/MCL (ref 0–0.2)
BASOPHILS NFR BLD AUTO: 0.9 %
BILIRUBIN DIRECT+TOT PNL SERPL-MCNC: 0.4 MG/DL
BUN SERPL-MCNC: 14 MG/DL (ref 9.8–20.1)
CALCIUM SERPL-MCNC: 10.2 MG/DL (ref 8.4–10.2)
CHLORIDE SERPL-SCNC: 103 MMOL/L (ref 98–107)
CO2 SERPL-SCNC: 27 MMOL/L (ref 23–31)
CREAT SERPL-MCNC: 0.76 MG/DL (ref 0.55–1.02)
DEPRECATED CALCIDIOL+CALCIFEROL SERPL-MC: 27.8 NG/ML (ref 30–80)
EOSINOPHIL # BLD AUTO: 0.21 X10(3)/MCL (ref 0–0.9)
EOSINOPHIL NFR BLD AUTO: 2.2 %
ERYTHROCYTE [DISTWIDTH] IN BLOOD BY AUTOMATED COUNT: 15.5 % (ref 11.5–17)
GFR SERPLBLD CREATININE-BSD FMLA CKD-EPI: >60 MLS/MIN/1.73/M2
GLOBULIN SER-MCNC: 3.7 GM/DL (ref 2.4–3.5)
GLUCOSE SERPL-MCNC: 107 MG/DL (ref 82–115)
HBV CORE AB SERPL QL IA: NONREACTIVE
HBV SURFACE AG SERPL QL IA: NONREACTIVE
HCT VFR BLD AUTO: 48.8 % (ref 37–47)
HCV AB SERPL QL IA: NONREACTIVE
HGB BLD-MCNC: 15.7 GM/DL (ref 12–16)
IMM GRANULOCYTES # BLD AUTO: 0.03 X10(3)/MCL (ref 0–0.04)
IMM GRANULOCYTES NFR BLD AUTO: 0.3 %
LYMPHOCYTES # BLD AUTO: 2.6 X10(3)/MCL (ref 0.6–4.6)
LYMPHOCYTES NFR BLD AUTO: 27.8 %
MCH RBC QN AUTO: 31.7 PG (ref 27–31)
MCHC RBC AUTO-ENTMCNC: 32.2 MG/DL (ref 33–36)
MCV RBC AUTO: 98.6 FL (ref 80–94)
MONOCYTES # BLD AUTO: 0.76 X10(3)/MCL (ref 0.1–1.3)
MONOCYTES NFR BLD AUTO: 8.1 %
NEUTROPHILS # BLD AUTO: 5.7 X10(3)/MCL (ref 2.1–9.2)
NEUTROPHILS NFR BLD AUTO: 60.7 %
NRBC BLD AUTO-RTO: 0 %
PLATELET # BLD AUTO: 256 X10(3)/MCL (ref 130–400)
PMV BLD AUTO: 11.4 FL (ref 7.4–10.4)
POTASSIUM SERPL-SCNC: 4.5 MMOL/L (ref 3.5–5.1)
PROT SERPL-MCNC: 7.3 GM/DL (ref 5.8–7.6)
RBC # BLD AUTO: 4.95 X10(6)/MCL (ref 4.2–5.4)
SODIUM SERPL-SCNC: 138 MMOL/L (ref 136–145)
T4 FREE SERPL-MCNC: 0.8 NG/DL (ref 0.7–1.48)
TSH SERPL-ACNC: 6.1 UIU/ML (ref 0.35–4.94)
WBC # SPEC AUTO: 9.4 X10(3)/MCL (ref 4.5–11.5)

## 2022-08-18 PROCEDURE — 84439 ASSAY OF FREE THYROXINE: CPT

## 2022-08-18 PROCEDURE — 86803 HEPATITIS C AB TEST: CPT

## 2022-08-18 PROCEDURE — 82306 VITAMIN D 25 HYDROXY: CPT

## 2022-08-18 PROCEDURE — 36415 COLL VENOUS BLD VENIPUNCTURE: CPT

## 2022-08-18 PROCEDURE — 84443 ASSAY THYROID STIM HORMONE: CPT

## 2022-08-18 PROCEDURE — 87340 HEPATITIS B SURFACE AG IA: CPT

## 2022-08-18 PROCEDURE — 85025 COMPLETE CBC W/AUTO DIFF WBC: CPT

## 2022-08-18 PROCEDURE — 80053 COMPREHEN METABOLIC PANEL: CPT

## 2022-08-18 PROCEDURE — 86704 HEP B CORE ANTIBODY TOTAL: CPT

## 2022-11-09 ENCOUNTER — OFFICE VISIT (OUTPATIENT)
Dept: INTERNAL MEDICINE | Facility: CLINIC | Age: 62
End: 2022-11-09
Payer: MEDICAID

## 2022-11-09 VITALS
DIASTOLIC BLOOD PRESSURE: 78 MMHG | BODY MASS INDEX: 46.09 KG/M2 | OXYGEN SATURATION: 94 % | TEMPERATURE: 99 F | HEART RATE: 72 BPM | WEIGHT: 260.13 LBS | RESPIRATION RATE: 20 BRPM | SYSTOLIC BLOOD PRESSURE: 142 MMHG | HEIGHT: 63 IN

## 2022-11-09 DIAGNOSIS — I10 HYPERTENSION, UNSPECIFIED TYPE: ICD-10-CM

## 2022-11-09 DIAGNOSIS — Z12.11 SCREENING FOR COLON CANCER: Primary | ICD-10-CM

## 2022-11-09 DIAGNOSIS — Z72.0 TOBACCO USE: ICD-10-CM

## 2022-11-09 DIAGNOSIS — E78.5 HYPERLIPIDEMIA, UNSPECIFIED HYPERLIPIDEMIA TYPE: ICD-10-CM

## 2022-11-09 DIAGNOSIS — J44.9 CHRONIC OBSTRUCTIVE PULMONARY DISEASE, UNSPECIFIED COPD TYPE: ICD-10-CM

## 2022-11-09 DIAGNOSIS — E03.9 HYPOTHYROIDISM, UNSPECIFIED TYPE: ICD-10-CM

## 2022-11-09 PROCEDURE — 99214 OFFICE O/P EST MOD 30 MIN: CPT | Mod: PBBFAC

## 2022-11-09 RX ORDER — LEVOTHYROXINE SODIUM 100 UG/1
100 TABLET ORAL DAILY
Qty: 30 TABLET | Refills: 11 | Status: SHIPPED | OUTPATIENT
Start: 2022-11-09 | End: 2023-09-14 | Stop reason: SDUPTHER

## 2022-11-09 NOTE — PROGRESS NOTES
[facility] Internal Medicine Resident Clinic    Subjective:      This is a 62 year old female with a past medical history of COPD, hypertension, hypothyroidism, tobacco use, sleep apnea, hyperlipidemia who presents on 11/9/2022 for follow up visit. This is her first face to face visit; last 2 appointments were via telemedicine, most recently on  8/17/22  She was previously patient of Dr. Noemí Schmitt    Patient states that she has no complaints today aside from chronic knee pain for which she follows with a pain specialist through which all of her painmanagement is addressed. She was last seen in her pain management clinic about a month ago, states she was given a toradol shot. She does not want any further imaging or interventions for her knee at this time.   Blood pressure elevated in 140s today, was previously on amlodipine when she was seeing her previous PCP but this was stopped. Patient attributes her BP today to pain from her knee and smoking cigarettes. She does not want to restart any antihypertensives at this time. She is agreeable to returning for a nurse visit in 3 weeks and starting medications if blood pressure is still elevated at that time.   States she was on inhalers in the past for undocumeted copd but stopped taking them and does not need them; only needs her cpap at night for TIMUR.    She is due for flu, tetanus, zoster and covid vaccines but declined all vaccines. Agreeable to cologuard but decline all other age recommended HM screening today. All question answered, Adjusting levothyroxine dosage today and rechecking labs in 6 weeks.     Review of Systems:  10 point ROS negative except for HPI    Objective:   Vital Signs:  Vitals:    11/09/22 1431   BP: (!) 142/78   Pulse:    Resp:    Temp:          General:  Well developed, well nourished, no acute respiratory distress  Head: Normocephalic, atraumatic  Eyes: PERRL, EOMI, anicteric sclera  Throat: No posterior pharyngeal erythema or exudate, no  tonsillar exudate  Neck: supple, normal ROM, no thyromegaly   CVS:  RRR, S1 and S2 normal, no murmurs, no added heart sounds, rubs, gallops, 2+ peripheral pulses  Resp:  Lungs clear to auscultation bilaterally, no wheezes, rales, or rhonci  GI:  Abdomen soft, non-tender, non-distended, normoactive bowel sounds  MSK:  No muscle atrophy, cyanosis, peripheral edema, full range of motion  Skin:  No rashes, ulcers, erythema  Neuro:  Alert and oriented x3, No focal neuro deficits, CNII-XII grossly intact  Psych:  Appropriate mood and affect         Laboratory:  Lab Results   Component Value Date    WBC 9.4 08/18/2022    HGB 15.7 08/18/2022    HCT 48.8 (H) 08/18/2022     08/18/2022    MCV 98.6 (H) 08/18/2022    RDW 15.5 08/18/2022    Lab Results   Component Value Date     08/18/2022    K 4.5 08/18/2022    CO2 27 08/18/2022    BUN 14.0 08/18/2022    CREATININE 0.76 08/18/2022    CALCIUM 10.2 08/18/2022      Lab Results   Component Value Date    HGBA1C 5.8 07/15/2021    .8 07/15/2021    CREATININE 0.76 08/18/2022    Lab Results   Component Value Date    TSH 6.0967 (H) 08/18/2022    [id number] 0.80 08/18/2022                  Current Medications:  Current Outpatient Medications   Medication Instructions    albuterol (PROVENTIL/VENTOLIN HFA) 90 mcg/actuation inhaler 2 puffs, Inhalation, As needed (PRN)    buprenorphine 5 mcg/hour (BUTRANS) weekly patch 1 patch, Transdermal, Weekly    diclofenac sodium (VOLTAREN) 0.5 g, Topical (Top), 4 times daily PRN    furosemide (LASIX) 40 mg, Oral, Daily    levothyroxine (SYNTHROID) 88 mcg, Oral, Daily        Assessment and Plan:        1. Hypothyroidism  -TSH elevated at 8.8 on 12/2021  -Levothyroxine was increased to 88mcg. Repeat TSH was 6.09. Increase levothyroxine to 100 mcg and recheck levels in 6 weeks    2. Hypertension  -Per previous office clinic note of Dr. Schmitt, BP was controlled  -it appears that Norvasc was stopped on last visit w/ Dr. Schmitt (patient no  longer taking, no longer on med rec)  -advised BP log at home  -BP today- SBP in 140's. She does not want to restart any antihypertensives at this time. Agreeable to nurse visit in 3 weeks and starting medications at that time if BP remains elevated    3. COPD-undocumented  -Patient reports that she self- stopped all inhalers that she was taking previously and has had no issues; no wheezing, cough, increased sputum, increased SOB at this time    4. TIMUR  -continue home CPAP     5. Tobacco Use  -Smoking cessation advised  -lung cancer screening UTD     6. Hyperlipidemia  -continue home atorvastatin    7. Chronic Knee Pain  -continue ff up with pain management clinic, does not want further intervention or workup at this time       Health Maintenance/ Wellness  Pneumococcal vaccine: UTD  TDAP: Declined  Influenza vaccine: Declined  Shingles: Declined  Mammogram: Last done on 6/2021_, results benign_(BIRADS 2), declined further screening  Screening colonoscopy will order cologuard  Lung Cancer Screening: benign, 2022 repeat 2023.  Hepatitis Panel: negative 2022      Counseling:    - Patient counselled on smoking cessation  - Educated on diet (portion control) and exercise (at least 30 minutes per day)  - Relevant educational materials provided      Medications: reconciled, discussed  and refills given.  RTC in 4 months  Return in 3 weeks for BP check. Start oral antihypertensive if BP elevated at that time  Increasing levothyroxine to 100 mcg daily; recheck thyroid studies in 6 weeks.    Ashley Cerna MD  Internal Medicine, PGY-2

## 2022-12-12 ENCOUNTER — HOSPITAL ENCOUNTER (EMERGENCY)
Facility: HOSPITAL | Age: 62
Discharge: HOME | End: 2022-12-12
Attending: INTERNAL MEDICINE
Payer: MEDICAID

## 2022-12-12 VITALS
DIASTOLIC BLOOD PRESSURE: 82 MMHG | BODY MASS INDEX: 44.29 KG/M2 | SYSTOLIC BLOOD PRESSURE: 149 MMHG | HEART RATE: 79 BPM | OXYGEN SATURATION: 95 % | RESPIRATION RATE: 18 BRPM | TEMPERATURE: 98 F | WEIGHT: 250 LBS

## 2022-12-12 DIAGNOSIS — M25.562 CHRONIC PAIN OF BOTH KNEES: Primary | ICD-10-CM

## 2022-12-12 DIAGNOSIS — M25.561 CHRONIC PAIN OF BOTH KNEES: Primary | ICD-10-CM

## 2022-12-12 DIAGNOSIS — G89.29 CHRONIC PAIN OF BOTH KNEES: Primary | ICD-10-CM

## 2022-12-12 PROCEDURE — 63600175 PHARM REV CODE 636 W HCPCS: Performed by: PHYSICIAN ASSISTANT

## 2022-12-12 PROCEDURE — 99284 EMERGENCY DEPT VISIT MOD MDM: CPT

## 2022-12-12 PROCEDURE — 96372 THER/PROPH/DIAG INJ SC/IM: CPT | Performed by: PHYSICIAN ASSISTANT

## 2022-12-12 RX ORDER — KETOROLAC TROMETHAMINE 10 MG/1
10 TABLET, FILM COATED ORAL EVERY 6 HOURS
Qty: 20 TABLET | Refills: 0 | Status: SHIPPED | OUTPATIENT
Start: 2022-12-12 | End: 2022-12-17

## 2022-12-12 RX ORDER — METHYLPREDNISOLONE 4 MG/1
TABLET ORAL
Qty: 1 EACH | Refills: 0 | OUTPATIENT
Start: 2022-12-12 | End: 2023-03-01

## 2022-12-12 RX ORDER — KETOROLAC TROMETHAMINE 30 MG/ML
30 INJECTION, SOLUTION INTRAMUSCULAR; INTRAVENOUS
Status: COMPLETED | OUTPATIENT
Start: 2022-12-12 | End: 2022-12-12

## 2022-12-12 RX ADMIN — KETOROLAC TROMETHAMINE 30 MG: 30 INJECTION, SOLUTION INTRAMUSCULAR; INTRAVENOUS at 04:12

## 2022-12-12 NOTE — DISCHARGE INSTRUCTIONS
Take all medications as prescribed.     Elevate your legs above your heart daily.       Return to ER for any changes or worsening of symptoms.

## 2022-12-12 NOTE — ED PROVIDER NOTES
Encounter Date: 12/12/2022       History     Chief Complaint   Patient presents with    Knee Pain     Right knee pain, chronic     63 YO WF in ER with complaints of chronic bilateral knee pain. Seeing pain management and has a butrans patch on but states it is not really helping with her pain, Denies injury/trauma, fever, chills, chest pain, SOB, abdominal pain, N/V/D, HA or dizziness. No other complaints.     The history is provided by the patient.   Review of patient's allergies indicates:   Allergen Reactions    Hydromorphone Shortness Of Breath     Other reaction(s): sob, smothering sensation    Methadone Shortness Of Breath     Other reaction(s): methadone, sob, tachycardia    Morphine Shortness Of Breath     Other reaction(s): morphine, sob, tachycardia    Sulfamethoxazole Itching     Other reaction(s): unknown, childhood allergy    Codeine Palpitations     Other reaction(s): childhood, unknown    Meperidine Nausea Only     Other reaction(s): nausea    Oxycodone-aspirin Palpitations     Other reaction(s): palpitations     Past Medical History:   Diagnosis Date    Allergy     Arthritis     Asthma      No past surgical history on file.  No family history on file.  Social History     Tobacco Use    Smoking status: Every Day     Packs/day: 0.50     Years: 50.00     Pack years: 25.00     Types: Cigarettes    Smokeless tobacco: Never   Substance Use Topics    Alcohol use: Not Currently    Drug use: Never     Review of Systems   Constitutional:  Negative for chills and fever.   HENT:  Negative for congestion and sore throat.    Respiratory:  Negative for shortness of breath.    Cardiovascular:  Negative for chest pain.   Gastrointestinal:  Negative for abdominal pain, diarrhea, nausea and vomiting.   Genitourinary:  Negative for dysuria.   Musculoskeletal:  Positive for arthralgias, back pain and myalgias.   Skin:  Negative for rash.   Neurological:  Negative for dizziness, weakness, light-headedness and headaches.    Hematological:  Does not bruise/bleed easily.   All other systems reviewed and are negative.    Physical Exam     Initial Vitals [12/12/22 1602]   BP Pulse Resp Temp SpO2   (!) 149/82 79 18 98.2 °F (36.8 °C) 95 %      MAP       --         Physical Exam    Nursing note and vitals reviewed.  Constitutional: She appears well-developed and well-nourished. She is not diaphoretic. No distress.   HENT:   Head: Normocephalic and atraumatic.   Eyes: Conjunctivae are normal.   Neck: Neck supple.   Normal range of motion.  Cardiovascular:  Normal rate, regular rhythm, normal heart sounds and intact distal pulses.           Pulmonary/Chest: Breath sounds normal. She has no wheezes.   Musculoskeletal:         General: Tenderness present. Normal range of motion.      Cervical back: Normal range of motion and neck supple.      Lumbar back: Tenderness present.        Back:       Right knee: Crepitus present. Tenderness present over the medial joint line and lateral joint line. Normal pulse.      Left knee: Crepitus present. Tenderness present over the medial joint line and lateral joint line. Normal pulse.        Legs:      Neurological: She is alert and oriented to person, place, and time. She has normal strength.   Skin: Skin is warm and dry.   Psychiatric: She has a normal mood and affect.       ED Course   Procedures  Labs Reviewed - No data to display       Imaging Results    None          Medications   ketorolac injection 30 mg (30 mg Intramuscular Given 12/12/22 1652)                 ED Course as of 12/12/22 1728   Mon Dec 12, 2022   1645 Counseled pt on continuing all meds as prescribed, will add Toradol and Medrol Dose pack for the next several day, pt in agreement with treatment plan [TT]      ED Course User Index  [TT] NANCY Wood                 Clinical Impression:   Final diagnoses:  [M25.561, M25.562, G89.29] Chronic pain of both knees (Primary)        ED Disposition Condition    Discharge Stable          ED  Prescriptions       Medication Sig Dispense Start Date End Date Auth. Provider    ketorolac (TORADOL) 10 mg tablet Take 1 tablet (10 mg total) by mouth every 6 (six) hours. for 5 days 20 tablet 12/12/2022 12/17/2022 NANCY Wood    methylPREDNISolone (MEDROL DOSEPACK) 4 mg tablet Take as package instructs 1 each 12/12/2022 -- NANCY Wood          Follow-up Information       Follow up With Specialties Details Why Contact Info    Ashley Cerna MD Internal Medicine Schedule an appointment as soon as possible for a visit in 3 days  [address] [facility] [address]  [phone]      [facility] - Emergency Dept Emergency Medicine In 3 days As needed, If symptoms worsen [address] [facility] [id number] [phone]             NANCY Wood  12/12/22 6157

## 2023-01-09 ENCOUNTER — DOCUMENTATION ONLY (OUTPATIENT)
Dept: ADMINISTRATIVE | Facility: HOSPITAL | Age: 63
End: 2023-01-09
Payer: MEDICAID

## 2023-01-12 ENCOUNTER — HOSPITAL ENCOUNTER (OUTPATIENT)
Dept: RADIOLOGY | Facility: HOSPITAL | Age: 63
Discharge: HOME | End: 2023-01-12
Attending: STUDENT IN AN ORGANIZED HEALTH CARE EDUCATION/TRAINING PROGRAM
Payer: MEDICAID

## 2023-01-12 ENCOUNTER — OFFICE VISIT (OUTPATIENT)
Dept: INTERNAL MEDICINE | Facility: CLINIC | Age: 63
End: 2023-01-12
Payer: MEDICAID

## 2023-01-12 VITALS
TEMPERATURE: 98 F | BODY MASS INDEX: 41.43 KG/M2 | HEIGHT: 63 IN | WEIGHT: 233.81 LBS | SYSTOLIC BLOOD PRESSURE: 122 MMHG | DIASTOLIC BLOOD PRESSURE: 81 MMHG | HEART RATE: 101 BPM | RESPIRATION RATE: 18 BRPM

## 2023-01-12 DIAGNOSIS — M17.0 OSTEOARTHRITIS OF BOTH KNEES, UNSPECIFIED OSTEOARTHRITIS TYPE: Primary | ICD-10-CM

## 2023-01-12 DIAGNOSIS — F17.200 NEEDS SMOKING CESSATION EDUCATION: ICD-10-CM

## 2023-01-12 DIAGNOSIS — I10 HYPERTENSION, UNSPECIFIED TYPE: ICD-10-CM

## 2023-01-12 DIAGNOSIS — Z72.0 TOBACCO USE: ICD-10-CM

## 2023-01-12 DIAGNOSIS — E03.9 HYPOTHYROIDISM, UNSPECIFIED TYPE: ICD-10-CM

## 2023-01-12 DIAGNOSIS — M17.0 OSTEOARTHRITIS OF BOTH KNEES, UNSPECIFIED OSTEOARTHRITIS TYPE: ICD-10-CM

## 2023-01-12 PROCEDURE — 99214 OFFICE O/P EST MOD 30 MIN: CPT | Mod: PBBFAC

## 2023-01-12 PROCEDURE — 73562 X-RAY EXAM OF KNEE 3: CPT | Mod: TC,50

## 2023-01-12 RX ORDER — DICLOFENAC SODIUM 75 MG/1
75 TABLET, DELAYED RELEASE ORAL 2 TIMES DAILY
COMMUNITY
Start: 2022-12-20 | End: 2023-03-01

## 2023-01-12 NOTE — PROGRESS NOTES
[facility] Internal Medicine Resident Clinic    Subjective:      Ms. Weber is a 62 year old female with a past medical history of COPD, hypertension, hypothyroidism, tobacco use, sleep apnea, hyperlipidemia who presents on 1/12/23 for follow up visit. Last office visit 11/9/2022 for follow up visit. Scheduled visit today for ER follow up after she was seen at [facility] ED  for R knee pain and given toradol and medrol dose pack      11/9/22:  Patient states that she has no complaints today aside from chronic knee pain for which she follows with a pain specialist Taran was last seen in her pain management clinic about a month ago, states she was given a toradol shot. She does not want any further imaging or interventions for her knee at this time. BP elevated in 140s today, was previously on amlodipine when she was seeing her previous PCP but this was stopped. Patient attributes her BP today to pain from her knee and smoking cigarettes. She does not want to restart any antihypertensives at this time. She is agreeable to returning for a nurse visit in 3 weeks and starting medications if blood pressure is still elevated at that time. States she was on inhalers in the past for undocumeted copd but stopped taking them and does not need them; only needs her cpap at night for TIMUR. Declined all vaccines. Agreeable to cologuard but decline all other age recommended HM screening today. All question answered, Adjusting levothyroxine dosage today and rechecking labs in 6 weeks.     1/12/23: Patient presents today for ER follow up. She was seen at [facility] ED on 12/12/22 with complaints of chronic bilateral knee pain R worse than L. She continues to see pain management but states that they have not really been helping with her pain. She states that pain was previously tolerable and was being managed by pain clinic however over the past 2 months, has been worsening, causing 8-9/10 pain when ambulating. Needs a knee brace on her left knee and  rolling walker to ambulate and states she is unable to sleep d/t pain. Will repeat xrs today and refer to ortho. Dosage for levothyroixine was adjusted on her last ov but she did not get repeat lab work done. Ordering thyroid studies today. Continues to decline immunization and HM screening. Did not submit her cologuard and states she does not really want to do it       Review of Systems:  10 point ROS negative except for HPI    Objective:   Vital Signs:  Vitals:    01/12/23 1422   BP: 122/81   Pulse: 101   Resp: 18   Temp: 98.1 °F (36.7 °C)         General:  Well developed, well nourished, no acute respiratory distress  Head: Normocephalic, atraumatic  Eyes: PERRL, EOMI, anicteric sclera  Throat: No posterior pharyngeal erythema or exudate, no tonsillar exudate  Neck: supple, normal ROM, no thyromegaly   CVS:  RRR, S1 and S2 normal, no murmurs, no added heart sounds, rubs, gallops, 2+ peripheral pulses  Resp:  Lungs clear to auscultation bilaterally, no wheezes, rales, or rhonci  GI:  Abdomen soft, non-tender, non-distended, normoactive bowel sounds  MSK:  No muscle atrophy, cyanosis, peripheral edema, limited ROM due to pain, L knee in brace.   Skin:  No rashes, ulcers, erythema  Neuro:  Alert and oriented x3, No focal neuro deficits, CNII-XII grossly intact  Psych:  Appropriate mood and affect         Laboratory:  Lab Results   Component Value Date    WBC 9.4 08/18/2022    HGB 15.7 08/18/2022    HCT 48.8 (H) 08/18/2022     08/18/2022    MCV 98.6 (H) 08/18/2022    RDW 15.5 08/18/2022    Lab Results   Component Value Date     08/18/2022    K 4.5 08/18/2022    CO2 27 08/18/2022    BUN 14.0 08/18/2022    CREATININE 0.76 08/18/2022    CALCIUM 10.2 08/18/2022      Lab Results   Component Value Date    HGBA1C 5.8 07/15/2021    .8 07/15/2021    CREATININE 0.76 08/18/2022    Lab Results   Component Value Date    TSH 6.0967 (H) 08/18/2022    [id number] 0.80 08/18/2022                  Current  Medications:  Current Outpatient Medications   Medication Instructions    buprenorphine 5 mcg/hour (BUTRANS) weekly patch 1 patch, Transdermal, Weekly    diclofenac sodium (VOLTAREN) 0.5 g, Topical (Top), 4 times daily PRN    furosemide (LASIX) 40 mg, Oral, Daily    levothyroxine (SYNTHROID) 100 mcg, Oral, Daily    methylPREDNISolone (MEDROL DOSEPACK) 4 mg tablet Take as package instructs        Assessment and Plan:       1. Hypothyroidism  -TSH elevated at 8.8 on 12/2021, repeat was 6.09 after dosage increase to 88. Increased dose to 100 mcg 10/22 but repeat labs not done  -will order TSH, T4 today    2. Hypertension  -Per previous office clinic note of Dr. Schmitt, BP was controlled. Was found to have elevated BP on last OV but did not want to restart medications  _Bp today: 122/81    3. COPD-undocumented  -Patient reports that she self- stopped all inhalers that she was taking previously and has had no issues; no wheezing, cough, increased sputum, increased SOB at this time     4. TIMUR  -continue home CPAP     5. Tobacco Use  -Smoking cessation advised  -lung cancer screening UTD     6. Hyperlipidemia  -continue home atorvastatin     7. Chronic Knee Pain  8. B/L Osteoarthritis knees  -continue ff up with pain management clinic, advised to call and move up appointment so pain regimen can be optimized  -repeating xrs today. Last done 2019 but now with worsening pain and more difficulty ambulating over the past few months. Pending results, will refer to Ortho for further evaluation     Health Maintenance/ Wellness  Pneumococcal vaccine: UTD  TDAP: Declined  Influenza vaccine: Declined  Shingles: Declined  Mammogram: Last done on 6/2021_, results benign_(BIRADS 2), declined further screening  Screening colonoscopy : cologuard ordered at last appt, patient states she will decide if she really wants to submit the specimen or not.   Lung Cancer Screening: benign, 2022 repeat 2023.  Hepatitis Panel: negative  2022      Counseling:    - Patient counselled on smoking cessation  - Educated on diet (portion control) and exercise (at least 30 minutes per day)  - Relevant educational materials provided      Medications: reconciled, discussed  and refills given.  RTC in 4 months  Thyroid studies to assess response of dose increase   XR of b/l knees pending, will refer to ortho pending results     Ashley Cerna MD  Internal Medicine, PGY-2

## 2023-02-28 PROCEDURE — 99284 EMERGENCY DEPT VISIT MOD MDM: CPT

## 2023-03-01 ENCOUNTER — HOSPITAL ENCOUNTER (EMERGENCY)
Facility: HOSPITAL | Age: 63
Discharge: HOME | End: 2023-03-01
Attending: FAMILY MEDICINE
Payer: COMMERCIAL

## 2023-03-01 VITALS
TEMPERATURE: 99 F | WEIGHT: 222 LBS | OXYGEN SATURATION: 95 % | HEART RATE: 94 BPM | RESPIRATION RATE: 20 BRPM | SYSTOLIC BLOOD PRESSURE: 126 MMHG | DIASTOLIC BLOOD PRESSURE: 76 MMHG | HEIGHT: 64 IN | BODY MASS INDEX: 37.9 KG/M2

## 2023-03-01 DIAGNOSIS — M17.0 OSTEOARTHRITIS OF BOTH KNEES, UNSPECIFIED OSTEOARTHRITIS TYPE: ICD-10-CM

## 2023-03-01 DIAGNOSIS — M25.561 BILATERAL CHRONIC KNEE PAIN: Primary | ICD-10-CM

## 2023-03-01 DIAGNOSIS — M25.562 BILATERAL CHRONIC KNEE PAIN: Primary | ICD-10-CM

## 2023-03-01 DIAGNOSIS — G89.29 BILATERAL CHRONIC KNEE PAIN: Primary | ICD-10-CM

## 2023-03-01 PROCEDURE — 96372 THER/PROPH/DIAG INJ SC/IM: CPT | Performed by: PHYSICIAN ASSISTANT

## 2023-03-01 PROCEDURE — 63600175 PHARM REV CODE 636 W HCPCS: Performed by: PHYSICIAN ASSISTANT

## 2023-03-01 RX ORDER — METHYLPREDNISOLONE 4 MG/1
TABLET ORAL
Qty: 1 EACH | Refills: 0 | Status: SHIPPED | OUTPATIENT
Start: 2023-03-01

## 2023-03-01 RX ORDER — KETOROLAC TROMETHAMINE 30 MG/ML
30 INJECTION, SOLUTION INTRAMUSCULAR; INTRAVENOUS
Status: COMPLETED | OUTPATIENT
Start: 2023-03-01 | End: 2023-03-01

## 2023-03-01 RX ORDER — KETOROLAC TROMETHAMINE 10 MG/1
10 TABLET, FILM COATED ORAL EVERY 6 HOURS
Qty: 20 TABLET | Refills: 0 | Status: SHIPPED | OUTPATIENT
Start: 2023-03-01 | End: 2023-03-06

## 2023-03-01 RX ADMIN — KETOROLAC TROMETHAMINE 30 MG: 30 INJECTION, SOLUTION INTRAMUSCULAR; INTRAVENOUS at 01:03

## 2023-03-01 NOTE — ED PROVIDER NOTES
Encounter Date: 2/28/2023       History     Chief Complaint   Patient presents with    Knee Pain     Pt c/o bilateral knee pain denies injury      61 YO WF in ER with complaints of continued chronic bilateral knee pain. States she needs knee replacements and seen Dr. Lucas for pain management but she has so many drug allergies she is limited to what she can take for her pain. Denies injury/trauma, fever, chills, chest pain, SOB, abdominal pain, N/V/D, HA or dizziness. No other complaints.     The history is provided by the patient.   Review of patient's allergies indicates:   Allergen Reactions    Hydromorphone Shortness Of Breath     Other reaction(s): sob, smothering sensation    Methadone Shortness Of Breath     Other reaction(s): methadone, sob, tachycardia    Morphine Shortness Of Breath     Other reaction(s): morphine, sob, tachycardia    Sulfamethoxazole Itching     Other reaction(s): unknown, childhood allergy    Codeine Palpitations     Other reaction(s): childhood, unknown    Meperidine Nausea Only     Other reaction(s): nausea    Oxycodone-aspirin Palpitations     Other reaction(s): palpitations     Past Medical History:   Diagnosis Date    Allergy     Arthritis     Asthma      No past surgical history on file.  No family history on file.  Social History     Tobacco Use    Smoking status: Every Day     Packs/day: 0.50     Years: 50.00     Pack years: 25.00     Types: Cigarettes    Smokeless tobacco: Never   Substance Use Topics    Alcohol use: Not Currently    Drug use: Never     Review of Systems   Constitutional:  Negative for chills and fever.   HENT:  Negative for congestion and sore throat.    Respiratory:  Negative for shortness of breath.    Cardiovascular:  Negative for chest pain.   Gastrointestinal:  Negative for abdominal pain, diarrhea, nausea and vomiting.   Genitourinary:  Negative for dysuria.   Musculoskeletal:  Positive for arthralgias and joint swelling. Negative for back pain.    Skin:  Negative for rash.   Neurological:  Negative for dizziness, weakness, light-headedness and headaches.   Hematological:  Does not bruise/bleed easily.   All other systems reviewed and are negative.    Physical Exam     Initial Vitals [02/28/23 2241]   BP Pulse Resp Temp SpO2   130/78 99 (!) 24 98.7 °F (37.1 °C) (!) 94 %      MAP       --         Physical Exam    Nursing note and vitals reviewed.  Constitutional: She appears well-developed and well-nourished. She is not diaphoretic. No distress.   HENT:   Head: Normocephalic and atraumatic.   Nose: Nose normal.   Eyes: Conjunctivae are normal.   Cardiovascular:  Normal rate and regular rhythm.           Pulmonary/Chest: Breath sounds normal.   Musculoskeletal:      Right knee: Crepitus present. No effusion or erythema. Decreased range of motion. Tenderness present over the medial joint line and lateral joint line.      Left knee: Crepitus present. No effusion or erythema. Decreased range of motion. Tenderness present over the medial joint line and lateral joint line.        Legs:       Comments: Ttp to joint lines and soft tissues of both knees, decreased ROM due to pain with crepitus noted with extension     Neurological: She is alert and oriented to person, place, and time. She has normal strength.   Skin: Skin is warm and dry.   Psychiatric: She has a normal mood and affect.       ED Course   Procedures  Labs Reviewed - No data to display       Imaging Results    None          Medications   ketorolac injection 30 mg (30 mg Intramuscular Given 3/1/23 0118)                 ED Course as of 03/01/23 0135   Wed Mar 01, 2023   0132 VSS, NAD, pt is non-toxic or ill appearing, imaging reviewed from 01/23 with pt, treatment plan and discharge instructions including follow up discussed, pt verbalized understanding, all questions answered, pt is stable and ready for discharge , will refer to Orthopedics at [facility] [TT]      ED Course User Index  [TT] NANCY Wood                  Clinical Impression:   Final diagnoses:  [M25.561, M25.562, G89.29] Bilateral chronic knee pain (Primary)  [M17.0] Osteoarthritis of both knees, unspecified osteoarthritis type        ED Disposition Condition    Discharge Stable          ED Prescriptions       Medication Sig Dispense Start Date End Date Auth. Provider    ketorolac (TORADOL) 10 mg tablet Take 1 tablet (10 mg total) by mouth every 6 (six) hours. for 5 days 20 tablet 3/1/2023 3/6/2023 NANCY Wood    methylPREDNISolone (MEDROL DOSEPACK) 4 mg tablet Take as package instructs 1 each 3/1/2023 -- NANCY Wood          Follow-up Information       Follow up With Specialties Details Why Contact Info    Ashley Cerna MD Internal Medicine Schedule an appointment as soon as possible for a visit in 3 days  [address] [facility] [address]  [phone]      [facility] Emergency Dept Emergency Medicine In 3 days As needed, If symptoms worsen [address] [facility] [id number] [phone]             NANCY Wood  03/01/23 0135

## 2023-03-14 ENCOUNTER — HOSPITAL ENCOUNTER (EMERGENCY)
Facility: HOSPITAL | Age: 63
Discharge: HOME | End: 2023-03-14
Attending: FAMILY MEDICINE
Payer: COMMERCIAL

## 2023-03-14 VITALS
WEIGHT: 222 LBS | RESPIRATION RATE: 20 BRPM | HEIGHT: 63 IN | DIASTOLIC BLOOD PRESSURE: 75 MMHG | TEMPERATURE: 98 F | OXYGEN SATURATION: 95 % | BODY MASS INDEX: 39.34 KG/M2 | HEART RATE: 98 BPM | SYSTOLIC BLOOD PRESSURE: 144 MMHG

## 2023-03-14 DIAGNOSIS — G89.29 BILATERAL CHRONIC KNEE PAIN: Primary | ICD-10-CM

## 2023-03-14 DIAGNOSIS — M25.561 BILATERAL CHRONIC KNEE PAIN: Primary | ICD-10-CM

## 2023-03-14 DIAGNOSIS — M25.562 BILATERAL CHRONIC KNEE PAIN: Primary | ICD-10-CM

## 2023-03-14 PROCEDURE — 63600175 PHARM REV CODE 636 W HCPCS: Performed by: PHYSICIAN ASSISTANT

## 2023-03-14 PROCEDURE — 96372 THER/PROPH/DIAG INJ SC/IM: CPT | Performed by: PHYSICIAN ASSISTANT

## 2023-03-14 PROCEDURE — 99284 EMERGENCY DEPT VISIT MOD MDM: CPT

## 2023-03-14 RX ORDER — KETOROLAC TROMETHAMINE 30 MG/ML
30 INJECTION, SOLUTION INTRAMUSCULAR; INTRAVENOUS
Status: COMPLETED | OUTPATIENT
Start: 2023-03-14 | End: 2023-03-14

## 2023-03-14 RX ORDER — MELOXICAM 15 MG/1
15 TABLET ORAL DAILY
Qty: 30 TABLET | Refills: 0 | Status: SHIPPED | OUTPATIENT
Start: 2023-03-14 | End: 2023-04-13

## 2023-03-14 RX ADMIN — KETOROLAC TROMETHAMINE 30 MG: 30 INJECTION, SOLUTION INTRAMUSCULAR at 05:03

## 2023-03-14 NOTE — DISCHARGE INSTRUCTIONS
Take all medications as prescribed.     Drink plenty of fluids and get a lot of rest.     Return to ER for any changes or worsening of symptoms.

## 2023-03-14 NOTE — ED PROVIDER NOTES
Encounter Date: 3/14/2023       History     Chief Complaint   Patient presents with    Knee Pain     Pt c/o chronic bilateral knee pain     63 YO WF in ER with complaints of continued bilateral knee pain worse on left today. States the only thing that really helps her knee pain is Toradol and she ran out of tablets yesterday. Her last ER visit was 3/1/23 and she was given 20 tablets. She is waiting to see Orthopedics in May. She called her pain management doctor and he told her to come to the ER. Denies injury/trauma, fever, chills, chest pain, SOB, abdominal pain, N/V/D, HA or dizziness. No other complaints.     The history is provided by the patient.   Review of patient's allergies indicates:   Allergen Reactions    Hydromorphone Shortness Of Breath     Other reaction(s): sob, smothering sensation    Methadone Shortness Of Breath     Other reaction(s): methadone, sob, tachycardia    Morphine Shortness Of Breath     Other reaction(s): morphine, sob, tachycardia    Sulfamethoxazole Itching     Other reaction(s): unknown, childhood allergy    Codeine Palpitations     Other reaction(s): childhood, unknown    Meperidine Nausea Only     Other reaction(s): nausea    Oxycodone-aspirin Palpitations     Other reaction(s): palpitations     Past Medical History:   Diagnosis Date    Allergy     Arthritis     Asthma      No past surgical history on file.  No family history on file.  Social History     Tobacco Use    Smoking status: Every Day     Packs/day: 0.50     Years: 50.00     Pack years: 25.00     Types: Cigarettes    Smokeless tobacco: Never   Substance Use Topics    Alcohol use: Not Currently    Drug use: Never     Review of Systems   Constitutional:  Negative for chills and fever.   HENT:  Negative for congestion and sore throat.    Respiratory:  Negative for shortness of breath.    Cardiovascular:  Negative for chest pain.   Gastrointestinal:  Negative for abdominal pain, diarrhea, nausea and vomiting.    Genitourinary:  Negative for dysuria.   Musculoskeletal:  Positive for arthralgias. Negative for back pain.   Skin:  Negative for rash.   Neurological:  Negative for dizziness, weakness, light-headedness and headaches.   Hematological:  Does not bruise/bleed easily.   All other systems reviewed and are negative.    Physical Exam     Initial Vitals [03/14/23 1638]   BP Pulse Resp Temp SpO2   (!) 144/75 98 20 97.7 °F (36.5 °C) 95 %      MAP       --         Physical Exam    Nursing note and vitals reviewed.  Constitutional: She appears well-developed and well-nourished. She is not diaphoretic. No distress.   HENT:   Head: Normocephalic and atraumatic.   Eyes: Conjunctivae are normal.   Neck: Neck supple.   Cardiovascular:  Normal rate and regular rhythm.           Pulmonary/Chest: Breath sounds normal.   Musculoskeletal:         General: Tenderness (ttp to bilateral knee anterior joint lines with mild swelling) present. No edema.      Cervical back: Neck supple.      Right knee: Crepitus present. Decreased range of motion. Tenderness present over the medial joint line and lateral joint line. Normal pulse.      Left knee: Crepitus present. Decreased range of motion. Tenderness present over the medial joint line and lateral joint line. Normal pulse.     Neurological: She is alert and oriented to person, place, and time.   Skin: Skin is warm and dry.   Psychiatric: She has a normal mood and affect.       ED Course   Procedures  Labs Reviewed - No data to display       Imaging Results    None          Medications   ketorolac injection 30 mg (30 mg Intramuscular Given 3/14/23 1723)                              Clinical Impression:   Final diagnoses:  [M25.561, M25.562, G89.29] Bilateral chronic knee pain (Primary)        ED Disposition Condition    Discharge Stable          ED Prescriptions       Medication Sig Dispense Start Date End Date Auth. Provider    meloxicam (MOBIC) 15 MG tablet Take 1 tablet (15 mg total) by  mouth once daily. 30 tablet 3/14/2023 4/13/2023 NANCY Wood          Follow-up Information       Follow up With Specialties Details Why Contact Info    Ashley Cerna MD Internal Medicine Schedule an appointment as soon as possible for a visit in 1 week  [address] [facility] [address]  [phone]      [facility] - Emergency Dept Emergency Medicine In 3 days As needed, If symptoms worsen [address] [facility] [id number] [phone]             NANCY Wood  03/14/23 9616

## 2023-04-06 ENCOUNTER — DOCUMENTATION ONLY (OUTPATIENT)
Dept: ADMINISTRATIVE | Facility: HOSPITAL | Age: 63
End: 2023-04-06
Payer: COMMERCIAL

## 2023-04-23 ENCOUNTER — HOSPITAL ENCOUNTER (EMERGENCY)
Facility: HOSPITAL | Age: 63
Discharge: HOME | End: 2023-04-24
Attending: EMERGENCY MEDICINE
Payer: MEDICAID

## 2023-04-23 DIAGNOSIS — M25.562 BILATERAL CHRONIC KNEE PAIN: Primary | ICD-10-CM

## 2023-04-23 DIAGNOSIS — M25.561 BILATERAL CHRONIC KNEE PAIN: Primary | ICD-10-CM

## 2023-04-23 DIAGNOSIS — G89.29 BILATERAL CHRONIC KNEE PAIN: Primary | ICD-10-CM

## 2023-04-23 LAB
ALBUMIN SERPL-MCNC: 2.8 G/DL (ref 3.4–4.8)
ALBUMIN/GLOB SERPL: 0.8 RATIO (ref 1.1–2)
ALP SERPL-CCNC: 94 UNIT/L (ref 40–150)
ALT SERPL-CCNC: 19 UNIT/L (ref 0–55)
AST SERPL-CCNC: 13 UNIT/L (ref 5–34)
BASOPHILS # BLD AUTO: 0.07 X10(3)/MCL (ref 0–0.2)
BASOPHILS NFR BLD AUTO: 0.9 %
BILIRUBIN DIRECT+TOT PNL SERPL-MCNC: 0.2 MG/DL
BUN SERPL-MCNC: 18 MG/DL (ref 9.8–20.1)
CALCIUM SERPL-MCNC: 9.6 MG/DL (ref 8.4–10.2)
CHLORIDE SERPL-SCNC: 106 MMOL/L (ref 98–107)
CO2 SERPL-SCNC: 22 MMOL/L (ref 23–31)
CREAT SERPL-MCNC: 0.85 MG/DL (ref 0.55–1.02)
EOSINOPHIL # BLD AUTO: 0.23 X10(3)/MCL (ref 0–0.9)
EOSINOPHIL NFR BLD AUTO: 2.9 %
ERYTHROCYTE [DISTWIDTH] IN BLOOD BY AUTOMATED COUNT: 13.8 % (ref 11.5–17)
GFR SERPLBLD CREATININE-BSD FMLA CKD-EPI: >60 MLS/MIN/1.73/M2
GLOBULIN SER-MCNC: 3.7 GM/DL (ref 2.4–3.5)
GLUCOSE SERPL-MCNC: 125 MG/DL (ref 82–115)
HCT VFR BLD AUTO: 41.9 % (ref 37–47)
HGB BLD-MCNC: 13.1 G/DL (ref 12–16)
IMM GRANULOCYTES # BLD AUTO: 0.05 X10(3)/MCL (ref 0–0.04)
IMM GRANULOCYTES NFR BLD AUTO: 0.6 %
LYMPHOCYTES # BLD AUTO: 1.49 X10(3)/MCL (ref 0.6–4.6)
LYMPHOCYTES NFR BLD AUTO: 18.5 %
MCH RBC QN AUTO: 30.4 PG (ref 27–31)
MCHC RBC AUTO-ENTMCNC: 31.3 G/DL (ref 33–36)
MCV RBC AUTO: 97.2 FL (ref 80–94)
MONOCYTES # BLD AUTO: 0.82 X10(3)/MCL (ref 0.1–1.3)
MONOCYTES NFR BLD AUTO: 10.2 %
NEUTROPHILS # BLD AUTO: 5.4 X10(3)/MCL (ref 2.1–9.2)
NEUTROPHILS NFR BLD AUTO: 66.9 %
NRBC BLD AUTO-RTO: 0 %
PLATELET # BLD AUTO: 339 X10(3)/MCL (ref 130–400)
PMV BLD AUTO: 10.4 FL (ref 7.4–10.4)
POTASSIUM SERPL-SCNC: 4.4 MMOL/L (ref 3.5–5.1)
PROT SERPL-MCNC: 6.5 GM/DL (ref 5.8–7.6)
RBC # BLD AUTO: 4.31 X10(6)/MCL (ref 4.2–5.4)
SODIUM SERPL-SCNC: 136 MMOL/L (ref 136–145)
WBC # SPEC AUTO: 8.1 X10(3)/MCL (ref 4.5–11.5)

## 2023-04-23 PROCEDURE — 80053 COMPREHEN METABOLIC PANEL: CPT | Performed by: NURSE PRACTITIONER

## 2023-04-23 PROCEDURE — 99284 EMERGENCY DEPT VISIT MOD MDM: CPT

## 2023-04-23 PROCEDURE — 85025 COMPLETE CBC W/AUTO DIFF WBC: CPT | Performed by: NURSE PRACTITIONER

## 2023-04-23 PROCEDURE — 96372 THER/PROPH/DIAG INJ SC/IM: CPT

## 2023-04-23 PROCEDURE — 63600175 PHARM REV CODE 636 W HCPCS

## 2023-04-23 RX ORDER — KETOROLAC TROMETHAMINE 30 MG/ML
30 INJECTION, SOLUTION INTRAMUSCULAR; INTRAVENOUS
Status: COMPLETED | OUTPATIENT
Start: 2023-04-23 | End: 2023-04-23

## 2023-04-23 RX ADMIN — KETOROLAC TROMETHAMINE 30 MG: 30 INJECTION, SOLUTION INTRAMUSCULAR; INTRAVENOUS at 09:04

## 2023-04-23 NOTE — FIRST PROVIDER EVALUATION
Medical screening examination initiated.  I have conducted a focused provider triage encounter, findings are as follows:    Brief history of present illness:  63y/o F presents to the ED with bilateral knee pain. States she has already been referred to orthopedic for bilateral knee replacement . States unable to stand. In triage she reports when having a BM today she noted blood in her stool.     There were no vitals filed for this visit.    Pertinent physical exam:  AAA x 3    Brief workup plan:  Labs    Preliminary workup initiated; this workup will be continued and followed by the physician or advanced practice provider that is assigned to the patient when roomed.

## 2023-04-24 VITALS
HEART RATE: 92 BPM | RESPIRATION RATE: 18 BRPM | SYSTOLIC BLOOD PRESSURE: 161 MMHG | TEMPERATURE: 99 F | DIASTOLIC BLOOD PRESSURE: 102 MMHG | OXYGEN SATURATION: 96 %

## 2023-04-24 RX ORDER — MELOXICAM 15 MG/1
15 TABLET ORAL DAILY
Qty: 30 TABLET | Refills: 0 | Status: SHIPPED | OUTPATIENT
Start: 2023-04-24 | End: 2023-05-25

## 2023-04-24 NOTE — ED NOTES
Pt. Dc home her BP was noted to be high pt. Reports her BP gets high when she is in pain but is not currently on any bp medications this RN offered to speak with provider for additional pain control pt. Declined saying she is always in pain but does feel better than when she first arrived to the ED. Pt. Instructed to follow up with PCP cab called for ride home pt. Has her keys and states she will be able to get herself inside her home. Pt. Wheeled out per this RN able to self transfer from chair to wheel chair with cane. Pt. Has no further questions at this time

## 2023-04-24 NOTE — ED PROVIDER NOTES
"Encounter Date: 4/23/2023       History     Chief Complaint   Patient presents with    Knee Pain     Bilateral knee pain xseveral months, worsening over past 2 weeks. + Knee and BLE swelling. Pt denies falls or trauma. Taking toradol at home with no relief. States "I need knee replacements". Also reports red blood in stool this morning, believes it is from constipation.      The patient is a 62 y.o. female with a history of chronic bilateral knee pain who presents to the Emergency Department with a chief complaint of bilateral knee pain. Patient reports she has been having bilateral knee pain for about 5 years. States that she is waiting on an appointment to be set up for her to see an orthopedist. Patient states she is aware that she needs bilateral knee replacements. She denies any fall or trauma to knees. She ambulates with a cane. States that she had been taking mobic with moderate relief of the pain but states that she is out of mobic. Her pain is currently rated as a 8/10 in severity and described as aching with no radiation. Associated symptoms include bilateral knee swelling. Symptoms are aggravated with ambulation and there are no alleviating factors. The patient denies numbness or tingling to extremities. She reports taking nothing prior to arrival with no relief of symptoms. Patient also notes that she noticed a steak of blood in her stool this morning. She denies any mango blood in stool. States that the rest of her stool was normal. States that she has been constipated. Patient states she is in pain management. No other reported symptoms at this time.      The history is provided by the patient. No  was used.   Knee Pain  This is a new problem. The current episode started more than 1 week ago. The problem occurs daily. The problem has not changed since onset.Pertinent negatives include no chest pain, no abdominal pain, no headaches and no shortness of breath. The symptoms are " aggravated by walking and bending. Nothing relieves the symptoms. She has tried nothing for the symptoms. The treatment provided no relief.   Review of patient's allergies indicates:   Allergen Reactions    Hydromorphone Shortness Of Breath     Other reaction(s): sob, smothering sensation    Methadone Shortness Of Breath     Other reaction(s): methadone, sob, tachycardia    Morphine Shortness Of Breath     Other reaction(s): morphine, sob, tachycardia    Sulfamethoxazole Itching     Other reaction(s): unknown, childhood allergy    Codeine Palpitations     Other reaction(s): childhood, unknown    Meperidine Nausea Only     Other reaction(s): nausea    Oxycodone-aspirin Palpitations     Other reaction(s): palpitations     Past Medical History:   Diagnosis Date    Allergy     Arthritis     Asthma      History reviewed. No pertinent surgical history.  History reviewed. No pertinent family history.  Social History     Tobacco Use    Smoking status: Every Day     Packs/day: 0.50     Years: 50.00     Pack years: 25.00     Types: Cigarettes    Smokeless tobacco: Never   Substance Use Topics    Alcohol use: Not Currently    Drug use: Never     Review of Systems   Constitutional:  Negative for appetite change and fever.   HENT:  Negative for sore throat.    Respiratory:  Negative for shortness of breath.    Cardiovascular:  Negative for chest pain.   Gastrointestinal:  Negative for abdominal pain and nausea.   Genitourinary:  Negative for dysuria, frequency and urgency.   Musculoskeletal:  Positive for arthralgias and joint swelling. Negative for back pain.   Skin:  Negative for rash.   Neurological:  Negative for weakness and headaches.   Hematological:  Does not bruise/bleed easily.   Psychiatric/Behavioral:  Negative for behavioral problems.    All other systems reviewed and are negative.    Physical Exam     Initial Vitals [04/23/23 1342]   BP Pulse Resp Temp SpO2   129/72 74 18 98.9 °F (37.2 °C) 97 %      MAP       --          Physical Exam    Nursing note and vitals reviewed.  Constitutional: She appears well-developed and well-nourished.   HENT:   Head: Normocephalic.   Right Ear: Hearing and tympanic membrane normal.   Left Ear: Hearing and tympanic membrane normal.   Mouth/Throat: Uvula is midline, oropharynx is clear and moist and mucous membranes are normal.   Cardiovascular:  Normal rate, regular rhythm, normal heart sounds and normal pulses.           Pulmonary/Chest: Effort normal and breath sounds normal.   Abdominal: Abdomen is soft. Bowel sounds are normal. There is no abdominal tenderness.   Genitourinary:    Rectum normal.   Rectum:      Guaiac result negative.   Guaiac negative stool.    Genitourinary Comments: Stool green; guaiac negative      Musculoskeletal:      Right knee: Swelling present. Decreased range of motion (due to pain). Tenderness present. Normal pulse.      Left knee: Swelling present. Decreased range of motion (due to pain). Tenderness present. Normal pulse.      Comments: All other adjacent joints normal      Lymphadenopathy:     She has no cervical adenopathy.   Neurological: She is alert. GCS eye subscore is 4. GCS verbal subscore is 5. GCS motor subscore is 6.   Skin: Skin is warm, dry and intact. Capillary refill takes less than 2 seconds.       ED Course   Procedures  Labs Reviewed   COMPREHENSIVE METABOLIC PANEL - Abnormal; Notable for the following components:       Result Value    Carbon Dioxide 22 (*)     Glucose Level 125 (*)     Albumin Level 2.8 (*)     Globulin 3.7 (*)     Albumin/Globulin Ratio 0.8 (*)     All other components within normal limits   CBC WITH DIFFERENTIAL - Abnormal; Notable for the following components:    MCV 97.2 (*)     MCHC 31.3 (*)     IG# 0.05 (*)     All other components within normal limits   CBC W/ AUTO DIFFERENTIAL    Narrative:     The following orders were created for panel order CBC Auto Differential.  Procedure                               Abnormality          Status                     ---------                               -----------         ------                     CBC with Differential[[id number]]        Abnormal            Final result                 Please view results for these tests on the individual orders.   URINALYSIS          Imaging Results    None          Medications   ketorolac injection 30 mg (30 mg Intramuscular Given 4/23/23 2139)     Medical Decision Making:   Initial Assessment:   The patient is a 62 y.o. female with a history of chronic bilateral knee pain who presents to the Emergency Department with a chief complaint of bilateral knee pain. Patient reports she has been having knee pain for over a year. States that she is waiting on an appointment to be set up for her to see an orthopedist. Patient states she is aware that she needs bilateral knee replacements. States that she had been taking mobic with moderate relief of the pain but states that she is out of mobic. Her pain is currently rated as a 8/10 in severity and described as aching with no radiation. Associated symptoms include bilateral knee swelling. Symptoms are aggravated with ambulation and there are no alleviating factors. The patient denies numbness or tingling to extremities. She reports taking nothing prior to arrival with no relief of symptoms. Patient also notes that she noticed a steak of blood in her stool this morning. She denies any mango blood in stool. States that the rest of her stool was normal. States that she has been constipated. No other reported symptoms at this time.    Differential Diagnosis:   Arthritis, chronic knee pain, constipation   Clinical Tests:   Lab Tests: Ordered and Reviewed  ED Management:  MDM  History obtained by patient.  Co-morbidities and/or factors adding to the complexity or risk for the patient?: none  Differential diagnoses: Arthritis, chronic knee pain, constipation   Decision to obtain previous or outside records?: no  Chart Review  "(nursing home, outside records, CareEverywhere): patient has had multiple er visits for bilateral knee pain.  The patient if patient's labs emergency department visit she told staff that she had appointment scheduled to see orthopedist in May.  Review of RX medications/new RX prescribed by me?: mobic  My EKG Interpretation: see above  Labs/imaging/other tests obtained/considered (risk/benefits of testing discussed): cbc, cmp  Labs/tests intepretation: labs unremarkable  My independent imaging interpretation: none  Treatment/interventions, IV fluids, IV medications: toradol  Complex management (IV controlled substances, went to OR, DNR, meds requiring monitoring, transfer, etc)?: none  Workup/treatment affected by social determinants of health?: none   Point of care US done/interpretation: none  Consults/radiologist/EMS/social work/family discussion/alternate history: none  Advanced care planning/end of life discussion: none  Shared decision making: shared decision making with patient  ETOH/smoking/drug cessation discussion: none  Dispo: discharge home.             ED Course as of 04/24/23 0003   Sun Apr 23, 2023   2332 Patient states that her pain is much better and she is ready for dc home.  [LM]   2348 Discussed results in great detail with patient. Patient states these symptoms have been present for > 5 years. She denies any new symptoms associated with her knee pain/swelling. She is waiting on an appointment for bilateral knee replacement. She has had multiple er visits and has been seen by her pcp for this pain. She is also in pain management. She is stating that she has gotten the best relief from mobic. She is requesting that I refill her mobic. Discussed with patient the importance of follow up. She is amendable to plan and wants to be discharged home. She states she feels "much better". [LM]      ED Course User Index  [LM] Walter Pascal NP                   Clinical Impression:   Final " diagnoses:  [M25.561, M25.562, G89.29] Bilateral chronic knee pain (Primary)        ED Disposition Condition    Discharge Stable          ED Prescriptions       Medication Sig Dispense Start Date End Date Auth. Provider    meloxicam (MOBIC) 15 MG tablet Take 1 tablet (15 mg total) by mouth once daily. 30 tablet 4/24/2023 5/24/2023 Walter Pascal NP          Follow-up Information       Follow up With Specialties Details Why Contact Info    Ashley Cerna MD Internal Medicine Schedule an appointment as soon as possible for a visit   [address] [facility] [address]  [phone]               Walter Pascal NP  04/24/23 0003

## 2023-05-25 ENCOUNTER — OFFICE VISIT (OUTPATIENT)
Dept: INTERNAL MEDICINE | Facility: CLINIC | Age: 63
End: 2023-05-25
Payer: MEDICAID

## 2023-05-25 VITALS
BODY MASS INDEX: 37.68 KG/M2 | HEIGHT: 63 IN | DIASTOLIC BLOOD PRESSURE: 67 MMHG | TEMPERATURE: 98 F | WEIGHT: 212.63 LBS | RESPIRATION RATE: 18 BRPM | SYSTOLIC BLOOD PRESSURE: 103 MMHG | HEART RATE: 94 BPM

## 2023-05-25 DIAGNOSIS — E03.9 HYPOTHYROIDISM, UNSPECIFIED TYPE: ICD-10-CM

## 2023-05-25 DIAGNOSIS — J44.9 CHRONIC OBSTRUCTIVE PULMONARY DISEASE, UNSPECIFIED COPD TYPE: ICD-10-CM

## 2023-05-25 DIAGNOSIS — E78.5 HYPERLIPIDEMIA, UNSPECIFIED HYPERLIPIDEMIA TYPE: ICD-10-CM

## 2023-05-25 DIAGNOSIS — Z72.0 TOBACCO USE: ICD-10-CM

## 2023-05-25 DIAGNOSIS — I10 HYPERTENSION, UNSPECIFIED TYPE: ICD-10-CM

## 2023-05-25 DIAGNOSIS — M17.0 OSTEOARTHRITIS OF BOTH KNEES, UNSPECIFIED OSTEOARTHRITIS TYPE: Primary | ICD-10-CM

## 2023-05-25 PROCEDURE — 99213 OFFICE O/P EST LOW 20 MIN: CPT | Mod: PBBFAC

## 2023-05-25 RX ORDER — KETOROLAC TROMETHAMINE 10 MG/1
10 TABLET, FILM COATED ORAL EVERY 6 HOURS
COMMUNITY
Start: 2023-05-09 | End: 2023-06-24

## 2023-05-25 NOTE — PROGRESS NOTES
[facility] Internal Medicine Resident Clinic    Subjective:      Ms. Weber is a 62 year old female with a past medical history of COPD, hypertension, hypothyroidism, tobacco use, sleep apnea, hyperlipidemia who presents on 5/25/23 for follow up visit. Last office visit was 1/12/23.     11/9/22:  Patient states that she has no complaints today aside from chronic knee pain for which she follows with a pain specialist Taran was last seen in her pain management clinic about a month ago, states she was given a toradol shot. She does not want any further imaging or interventions for her knee at this time. BP elevated in 140s today, was previously on amlodipine when she was seeing her previous PCP but this was stopped. Patient attributes her BP today to pain from her knee and smoking cigarettes. She does not want to restart any antihypertensives at this time. She is agreeable to returning for a nurse visit in 3 weeks and starting medications if blood pressure is still elevated at that time. States she was on inhalers in the past for undocumeted copd but stopped taking them and does not need them; only needs her cpap at night for TIMUR. Declined all vaccines. Agreeable to cologuard but decline all other age recommended HM screening today. All question answered, Adjusting levothyroxine dosage today and rechecking labs in 6 weeks.     1/12/23: Patient presents today for ER follow up. She was seen at [facility] ED on 12/12/22 with complaints of chronic bilateral knee pain R worse than L. She continues to see pain management but states that they have not really been helping with her pain. She states that pain was previously tolerable and was being managed by pain clinic however over the past 2 months, has been worsening, causing 8-9/10 pain when ambulating. Needs a knee brace on her left knee and rolling walker to ambulate and states she is unable to sleep d/t pain. Will repeat xrs today and refer to ortho. Dosage for levothyroixine was  adjusted on her last ov but she did not get repeat lab work done. Ordering thyroid studies today. Continues to decline immunization and HM screening. Did not submit her cologuard and states she does not really want to do it    5/25/23: Patient presents today still with complaints of chronic bilateral knee pain R>L. X-rays were repeated on her last visit which revealed degenerative changes that were greater in right medial compartment. She has been seeing an outside orthopedic surgeon as she is under workmens comp and states she was told they will attempt rehab and if this does not work, they will try shots but no plans for surgical intervention at this time. States she is unable to go for a 2nd opinion d/t workmen's comp technicalities. She is seeing a pain management specialist and has a prescription for ketorolac and buprenorphine which she states she has not had a chance to fill yet. No other complaints a t this time. Continues to decline further HM screening       Review of Systems:  10 point ROS negative except for HPI    Objective:   Vital Signs:    Vitals:    05/25/23 1339   BP: 103/67   Pulse: 94   Resp: 18   Temp: 98.4 °F (36.9 °C)       General:  Well developed, well nourished, no acute respiratory distress  Head: Normocephalic, atraumatic  Eyes: PERRL, EOMI, anicteric sclera  Throat: No posterior pharyngeal erythema or exudate, no tonsillar exudate  Neck: supple, normal ROM, no thyromegaly   CVS:  RRR, S1 and S2 normal, no murmurs, no added heart sounds, rubs, gallops, 2+ peripheral pulses  Resp:  Lungs clear to auscultation bilaterally, no wheezes, rales, or rhonci  GI:  Abdomen soft, non-tender, non-distended, normoactive bowel sounds  MSK:  No muscle atrophy, cyanosis, peripheral edema, limited ROM due to pain, L knee in brace.   Skin:  No rashes, ulcers, erythema  Neuro:  Alert and oriented x3, No focal neuro deficits, CNII-XII grossly intact  Psych:  Appropriate mood and affect     Laboratory:  Lab  Results   Component Value Date    WBC 8.1 04/23/2023    HGB 13.1 04/23/2023    HCT 41.9 04/23/2023     04/23/2023    MCV 97.2 (H) 04/23/2023    RDW 13.8 04/23/2023    Lab Results   Component Value Date     04/23/2023    K 4.4 04/23/2023    CO2 22 (L) 04/23/2023    BUN 18.0 04/23/2023    CREATININE 0.85 04/23/2023    CALCIUM 9.6 04/23/2023      Lab Results   Component Value Date    HGBA1C 5.8 07/15/2021    .8 07/15/2021    CREATININE 0.85 04/23/2023    Lab Results   Component Value Date    TSH 2.716 01/12/2023    [id number] 0.96 01/12/2023                  Current Medications:  Current Outpatient Medications   Medication Instructions    buprenorphine 5 mcg/hour (BUTRANS) weekly patch 1 patch, Transdermal, Weekly    furosemide (LASIX) 40 mg, Oral, Daily    levothyroxine (SYNTHROID) 100 mcg, Oral, Daily    methylPREDNISolone (MEDROL DOSEPACK) 4 mg tablet Take as package instructs        Assessment and Plan:       1. Hypothyroidism  -Continue levothyroxine 100 mcg   -TSH checked 1/2023 within normal limits    2. Hypertension  -Per previous office clinic note of Dr. Schmitt, BP was controlled. Was found to have elevated BP on last OV but did not want to restart medications  _Bp today: 103/67    3. COPD-undocumented  -Patient reports that she self- stopped all inhalers that she was taking previously and has had no issues; no wheezing, cough, increased sputum, increased SOB at this time     4. TIMUR  -continue home CPAP     5. Tobacco Use  -quit smoking 1.5 weeks ago      6. Hyperlipidemia  -continue home atorvastatin     7. Chronic Knee Pain  8. B/L Osteoarthritis knees  -follows with a pain management clinic  -XR done 1/2023 showed more prominent degenrative changes of R.   -follows with outside ortho, states plans for rehab and possible injections if rehab is not helpful with pain     Health Maintenance/ Wellness  Pneumococcal vaccine: UTD  TDAP: Declined  Influenza vaccine: Declined  Shingles:  Declined  Mammogram: Last done on 6/2021_, results benign_(BIRADS 2), declined further screening  Screening colonoscopy : cologuard ordered at last appt, patient states shehas decided not to submit  Lung Cancer Screening: benign, 2022 repeat 2023- refused   Hepatitis Panel: negative 2022    Counseling    - Patient counselled on smoking cessation  - Educated on diet (portion control) and exercise (at least 30 minutes per day)  - Relevant educational materials provided    Medications: reconciled, discussed  and refills given.  RTC in 4 months      Ashley Cerna MD  Internal Medicine, PGY-2

## 2023-06-24 ENCOUNTER — HOSPITAL ENCOUNTER (EMERGENCY)
Facility: HOSPITAL | Age: 63
Discharge: HOME | End: 2023-06-24
Attending: INTERNAL MEDICINE
Payer: COMMERCIAL

## 2023-06-24 VITALS
HEART RATE: 96 BPM | OXYGEN SATURATION: 96 % | HEIGHT: 63 IN | TEMPERATURE: 99 F | BODY MASS INDEX: 36.5 KG/M2 | DIASTOLIC BLOOD PRESSURE: 77 MMHG | WEIGHT: 206 LBS | RESPIRATION RATE: 20 BRPM | SYSTOLIC BLOOD PRESSURE: 123 MMHG

## 2023-06-24 DIAGNOSIS — M25.562 CHRONIC PAIN OF BOTH KNEES: ICD-10-CM

## 2023-06-24 DIAGNOSIS — G57.93 NEUROPATHIC PAIN OF BOTH FEET: Primary | ICD-10-CM

## 2023-06-24 DIAGNOSIS — M25.561 CHRONIC PAIN OF BOTH KNEES: ICD-10-CM

## 2023-06-24 DIAGNOSIS — G89.29 CHRONIC PAIN OF BOTH KNEES: ICD-10-CM

## 2023-06-24 PROCEDURE — 96372 THER/PROPH/DIAG INJ SC/IM: CPT | Performed by: PHYSICIAN ASSISTANT

## 2023-06-24 PROCEDURE — 99284 EMERGENCY DEPT VISIT MOD MDM: CPT

## 2023-06-24 PROCEDURE — 63600175 PHARM REV CODE 636 W HCPCS: Performed by: PHYSICIAN ASSISTANT

## 2023-06-24 RX ORDER — KETOROLAC TROMETHAMINE 30 MG/ML
30 INJECTION, SOLUTION INTRAMUSCULAR; INTRAVENOUS
Status: COMPLETED | OUTPATIENT
Start: 2023-06-24 | End: 2023-06-24

## 2023-06-24 RX ORDER — GABAPENTIN 100 MG/1
100 CAPSULE ORAL 3 TIMES DAILY
Qty: 90 CAPSULE | Refills: 0 | Status: SHIPPED | OUTPATIENT
Start: 2023-06-24 | End: 2023-09-14

## 2023-06-24 RX ADMIN — KETOROLAC TROMETHAMINE 30 MG: 30 INJECTION, SOLUTION INTRAMUSCULAR; INTRAVENOUS at 04:06

## 2023-06-24 NOTE — ED PROVIDER NOTES
"Encounter Date: 2023       History     Chief Complaint   Patient presents with    Knee Pain     Acute on chronic bilateral knee pain with lower leg swelling. "I'm suppose to have surgery on both knees..." No recent injury.      61 YO WF in ER with complaints of chronic bilateral knee pain and a burning sensation to her left foot. Denies injury/trauma, fever, chills, chest pain, SOB, abdominal pain, N/V/D, HA or dizziness. No other complaints.     The history is provided by the patient.   Review of patient's allergies indicates:   Allergen Reactions    Hydromorphone Shortness Of Breath     Other reaction(s): sob, smothering sensation    Methadone Shortness Of Breath     Other reaction(s): methadone, sob, tachycardia    Morphine Shortness Of Breath     Other reaction(s): morphine, sob, tachycardia    Sulfamethoxazole Itching     Other reaction(s): unknown, childhood allergy    Codeine Palpitations     Other reaction(s): childhood, unknown    Meperidine Nausea Only     Other reaction(s): nausea    Oxycodone-aspirin Palpitations     Other reaction(s): palpitations     Past Medical History:   Diagnosis Date    Allergy     Arthritis     Asthma      History reviewed. No pertinent surgical history.  History reviewed. No pertinent family history.  Social History     Tobacco Use    Smoking status: Former     Packs/day: 0.50     Years: 50.00     Pack years: 25.00     Types: Cigarettes     Quit date: 2023     Years since quittin.1    Smokeless tobacco: Never   Substance Use Topics    Alcohol use: Not Currently    Drug use: Never     Review of Systems   Constitutional:  Negative for chills and fever.   HENT:  Negative for congestion and sore throat.    Respiratory:  Negative for shortness of breath.    Cardiovascular:  Negative for chest pain.   Gastrointestinal:  Negative for abdominal pain, diarrhea, nausea and vomiting.   Genitourinary:  Negative for dysuria.   Musculoskeletal:  Positive for arthralgias. " Negative for back pain.   Skin:  Negative for rash.   Neurological:  Negative for dizziness, weakness, light-headedness and headaches.   Hematological:  Does not bruise/bleed easily.   All other systems reviewed and are negative.    Physical Exam     Initial Vitals [06/24/23 1503]   BP Pulse Resp Temp SpO2   123/77 96 20 98.5 °F (36.9 °C) 96 %      MAP       --         Physical Exam    Nursing note and vitals reviewed.  Constitutional: She appears well-developed and well-nourished. She is not diaphoretic. No distress.   HENT:   Head: Normocephalic and atraumatic.   Nose: Nose normal.   Eyes: Conjunctivae are normal.   Cardiovascular:  Normal rate, regular rhythm and intact distal pulses.           Pulmonary/Chest: Breath sounds normal.   Musculoskeletal:      Right knee: Crepitus present. Decreased range of motion. Tenderness present over the medial joint line and lateral joint line. Normal pulse.      Left knee: Crepitus present. No swelling or deformity. Decreased range of motion. Tenderness present over the medial joint line and lateral joint line. Normal pulse.      Left foot: Normal range of motion. No swelling, deformity or tenderness. Normal pulse.     Neurological: She is alert and oriented to person, place, and time. She has normal strength.   Skin: Skin is warm and dry.   Psychiatric: She has a normal mood and affect.       ED Course   Procedures  Labs Reviewed - No data to display       Imaging Results    None          Medications   ketorolac injection 30 mg (has no administration in time range)                              Clinical Impression:   Final diagnoses:  [G57.93] Neuropathic pain of both feet (Primary)  [M25.561, M25.562, G89.29] Chronic pain of both knees        ED Disposition Condition    Discharge Stable          ED Prescriptions       Medication Sig Dispense Start Date End Date Auth. Provider    gabapentin (NEURONTIN) 100 MG capsule Take 1 capsule (100 mg total) by mouth 3 (three) times  daily. 90 capsule 6/24/2023 7/24/2023 NANCY Wood          Follow-up Information       Follow up With Specialties Details Why Contact Info    Ashley Cerna MD Internal Medicine Schedule an appointment as soon as possible for a visit in 3 days  [address] [facility] [address]  [phone]      [facility] Emergency Dept Emergency Medicine In 3 days As needed, If symptoms worsen [address] [facility] [address]  [phone]             NANCY Wood  06/24/23 1611

## 2023-08-03 ENCOUNTER — OFFICE VISIT (OUTPATIENT)
Dept: ORTHOPEDICS | Facility: CLINIC | Age: 63
End: 2023-08-03
Payer: COMMERCIAL

## 2023-08-03 VITALS — BODY MASS INDEX: 32.44 KG/M2 | WEIGHT: 190 LBS | HEIGHT: 64 IN

## 2023-08-03 DIAGNOSIS — M17.0 PRIMARY OSTEOARTHRITIS OF BOTH KNEES: Primary | ICD-10-CM

## 2023-08-03 DIAGNOSIS — R29.898 BILATERAL LEG WEAKNESS: ICD-10-CM

## 2023-08-03 PROCEDURE — 99213 OFFICE O/P EST LOW 20 MIN: CPT | Mod: PBBFAC | Performed by: NURSE PRACTITIONER

## 2023-08-03 PROCEDURE — 99215 OFFICE O/P EST HI 40 MIN: CPT | Mod: S$PBB,,, | Performed by: NURSE PRACTITIONER

## 2023-08-03 PROCEDURE — 99215 PR OFFICE/OUTPT VISIT, EST, LEVL V, 40-54 MIN: ICD-10-PCS | Mod: S$PBB,,, | Performed by: NURSE PRACTITIONER

## 2023-08-03 NOTE — PROGRESS NOTES
"  Subjective:   PATIENT ID: Mabel Weber is a 63 y.o. female. Smoker. Employment HX: , currently disability.    Seen [facility] ortho for same DX since n/a.   CHIEF COMPLAINT: Pain of the Left Knee and Pain of the Right Knee    HPI:    Bilateral L > R aching and stabbing medial, anterior knee pain.   Injury:  11/21/17 fall onto wet cement   Onset: several years ago fluctuates   Modifying Factors: worse with activity, improves with rest, stiffness after immobilization, and improves with less than 30 minutes activity  Associated Symptoms: crepitus, decreased ROM, swelling, and "giving out"   Activity: sedentary with light activity, pain moderately interferes with ADLs , and use of assistive device wheelchair  Previous Treatments:  BMI reduction ongoing education, HEP withTheraBand, RX NSAIDs, completed RX PT 2018  and CSI since 2018 last injection 2018 , meniscal tear repair w/ Dr. Moe in 2018 without symptom relief  PMH: negative for contraindications for NSAID use  Family History: - OA    NOTE: New patient referred for bilateral knee pain and LE weakness  with some conservative treatments.  Symptoms affecting ADLs and ability to work.       Current Outpatient Medications:     buprenorphine 5 mcg/hour (BUTRANS) weekly patch, Place 1 patch onto the skin once a week., Disp: , Rfl:     furosemide (LASIX) 40 MG tablet, Take 40 mg by mouth once daily., Disp: , Rfl:     levothyroxine (SYNTHROID) 100 MCG tablet, Take 1 tablet (100 mcg total) by mouth once daily., Disp: 30 tablet, Rfl: 11    methylPREDNISolone (MEDROL DOSEPACK) 4 mg tablet, Take as package instructs, Disp: 1 each, Rfl: 0    gabapentin (NEURONTIN) 100 MG capsule, Take 1 capsule (100 mg total) by mouth 3 (three) times daily., Disp: 90 capsule, Rfl: 0  Review of patient's allergies indicates:   Allergen Reactions    Hydromorphone Shortness Of Breath     Other reaction(s): sob, smothering sensation    Methadone Shortness Of Breath     Other " "reaction(s): methadone, sob, tachycardia    Morphine Shortness Of Breath     Other reaction(s): morphine, sob, tachycardia    Sulfamethoxazole Itching     Other reaction(s): unknown, childhood allergy    Codeine Palpitations     Other reaction(s): childhood, unknown    Meperidine Nausea Only     Other reaction(s): nausea    Oxycodone-aspirin Palpitations     Other reaction(s): palpitations     Hemoglobin A1c   Date Value Ref Range Status   07/15/2021 5.8 <<=7.0 % Final   01/14/2021 5.8 <<=7.0 % Final   10/16/2019 5.8 4.2 - 6.3 % Final      Body mass index is 32.61 kg/m².   Vitals:    08/03/23 1244   Weight: 86.2 kg (190 lb)   Height: 5' 4" (1.626 m)   PainSc:   8      REVIEW OF SYSTEMS:  A ten-point review of systems was performed and is negative, except as mentioned above   Objective:   MSK Bilateral Knee  General:  No apparent distress, no pain indicators, obesity   Inspection: patient unable to walk in clinic, mild effusion right, moderate left, no weight bearing- wheelchair in use in clinic today, no erythema, no contusion, severe quad deconditioning, varus deformity, bilateral LE moderate diffuse edema of foot, ankles and calves r/t PVD w/ noted compression bandages.    Palpation: BILATERAL knees diffuse tenderness with palpation especially at medial and lateral joint line L>R, peripatellar soft tissue, tenderness of patellar tendon, tibial plateau noted  ROM:    Active Flexion / Extension (0-140):  5 / 100 painful (R)  8 / 85 painful (L)  Strength:   Flexion     2 / 5 (R)  2 / 5 (L)  Extension     2 / 5 (R)  2 / 5 (L)  Special Testing:  IT Band Testing  Ty's Test:  + (R)  + (L)  Effusion Testing  Ballotable Effusion:   -- (R)  + (L)  Fluid Wave:    -- (R)  + (L)  Patellar Testing  Patellar Grind:    + (R)  + (L)  Patellar Facet Pain:   + (R)  + (L)  Apprehension:    -- (R)  -- (L)  Meniscal Testing  Sandra's test:    + (R)  + (L)  Thessaly's Test:    Not Tested (R)  Not Tested (L)  Ligament " Testing  Unable to test s/t pain  Neurovascular: Intact to light touch  Neuro/ Psych: Awake, alert, oriented, normal mood and affect  Skin/ Soft Tissue: no rash, skin intact  Assessment:   IMAGING: X-ray 3 views of bilateral knees dated 1/12/23 reviewed and independently interpreted by me.  Discussed with patient. Noted no acute, DJD noted.    Unable to complete 4 view standing.  Will reassess and complete at next visit if able to.     XR KNEE 3 VIEW BILATERAL 1/12/23  CLINICAL HISTORY:  Bilateral primary osteoarthritis of knee  COMPARISON:  None.  FINDINGS:  No acute displaced fractures or dislocations.  There is narrowing of the medial and lateral compartments of the right knee joint with more significant narrowing and almost complete obliteration of the medial compartment of the right knee joint with marginal osteophytes also on the lateral compartment articular spaces otherwise preserved with smooth articular surfaces  No blastic or lytic lesions.  Soft tissues within normal limits.  Impression:  Degenerative changes more prominent in the medial compartment of the right knee joint.    XR Spine Lumbar 2 or 3 Views 8/15/19  INDICATION  Back Pain  Comparison: None available  FINDINGS  There are 5 nonrib-bearing lumbar-type vertebral bodies. There is a  mild S-shaped curvature of the lumbar spine, otherwise, alignment is  preserved. Vertebral body heights are maintained. There is disc height  loss at L1-2 and L4-5. Small multilevel marginal osteophytes are  noted. There is facet arthropathy at L5-S1. Vascular calcifications  are noted.  IMPRESSION  1.  No acute bony abnormality.  2.  Multilevel degenerative disc disease and facet arthropathy at  L5-S1.    Color-flow duplex imaging of the veins of the lower extremities 2/7/19  Clinical history is swelling  The common femoral, superficial femoral, popliteal, peroneal appear  clear bilaterally. There are varicose veins seen at the ankle  bilaterally. There appears to  be an effusion present in the left knee.  IMPRESSION: No venous thrombosis is demonstrated.    MRI Knee Without Contrast Left 3/15/18  Impression  1. Moderate osteoarthrosis left knee. Mild chondromalacia patella.   2. Horizontal tear and maceration anterior horn lateral meniscus.   3. Mild tendinosis proximal patellar tendon.   4. Popliteal cyst.   5. Joint effusion. Anterior subcutaneous edema.  Narrative  Exam:  MRI left knee without contrast March 15, 2018.  Clinical history:  Patient's left November 21, 2017 left knee stabbing and burning pains.  Priors: None  Technique: Axial T2 fat saturation, sagittal PD, sagittal T2 fat saturation, coronal T1 and coronal PD fat saturation images left knee obtained.  Findings:  No fractures are identified. Anterior and posterior cruciate ligaments are intact. Mild increased signal intensity proximal patellar tendon without any evidence of tear, most consistent with mild tendinosis. Patellar tendon is intact. The medial and lateral collateral ligaments are intact. There is abnormal morphology anterior horn of the lateral meniscus which is small in size, oblique oriented signal intensity anterior horn lateral meniscus which contacts the inferior articular surface consistent with tear. There is a lateral extrusion anterior horn lateral meniscus. No evidence of tear of the medial meniscus. There is chondral thinning medial femoral condyle and medial tibial plateau articular cartilage, focal area fissuring mid articular surface femoral condyle articular cartilage. Milder degree of chondral thinning lateral tibial plateau and lateral femoral condyle articular cartilage. Subchondral cyst medial and lateral tibial plateaus. Chondral thinning patellar articular cartilage most pronounced lateral jugular facet with small subchondral cystic change lateral patellar facet. Popliteal cyst with axial dimensions of 0.7 x 2.2 cm and craniocaudal dimension of 4.1 cm. Anterior subcutaneous  edema. Moderate-sized joint effusion.    EMR REVIEW: completed with noted Referral documentation reviewed    DIAGNOSIS:  1. Primary osteoarthritis of both knees    2. Bilateral leg weakness    3. BMI 32.0-32.9,adult       Plan:      Ongoing education about DX and treatment recommendations including conservative treatments of daily HEP with TheraBand, BMI reduction goal 5-10% of body weight (150# overall goal), muscle strengthening with use of stationary bike (RPM set at 80 or > with slow progression to goal of 40 minutes 3-4 times per week as tolerated), adequate vit D/C, glucosamine 1500 mg/day and daily acetaminophen 1000 mg 3 times/ day if able to tolerate.    Treatment plan: Severe exacerbation of chronic Bilateral L < R Severe knee arthritis, complicated by lumbar back, chronic PVD, obesity and bilateral LE weakness s/t disuse  Recommend starting initial conservative treatments including: HEP with TheraBand > 6 weeks and formal RX PT.  If inadequate at f/u will consider VS injections.   Extended time spent with patient educating about knee pain r/t DJD which is being severely complicated by multiple co-morbid conditions.  Treatment plan discussed at length.  Patient educated on importance of RX PT in order to start process of improving weakness, stiffness and mobility.  Patient verbalized understanding, agrees to plan and denies questions.   Patient DMV paperwork completed for temporary handicap license plate s/t mobility limitations.   Procedure:  offered CSI today, patient declines due to past experiences w/o relief.  Educated on VS injections and would like to consider VS in future.   RX Medications: continue medications as RX per PCP.  RTC: 6 weeks seek PA for VS injections at f/u.  NOTE: Conversation had with patient discussing surgical versus conservative treatment options.  At this time patient declines referral to surgeon for further eval. and surgical treatment options.  If status changes patient will  up date me.        Jacqueline Saeedspercy [facility]  Procedure Note:   None    Time Based Billing     Total Time Spent with Patient: 60 minutes .  Visit Start Time: 1300  10 minutes spent prior to visit reviewing EMR, prior labs and x-rays.  35 minutes spent in visit with patient face-to-face time completing exam, obtaining history, educating on DX and treatment plan.  15 minutes spent after visit completing EMR documentation.   Visit End Time: 1400

## 2023-09-11 NOTE — PROGRESS NOTES
[facility] Internal Medicine Resident Clinic    Subjective:      Ms. Weber is a 62 year old female with a past medical history of COPD, hypertension, hypothyroidism, tobacco use, sleep apnea, hyperlipidemia who presents on 9/14/23 for follow up visit. Last office visit was on 5/25/23 11/9/22:  Patient states that she has no complaints today aside from chronic knee pain for which she follows with a pain specialist Taran was last seen in her pain management clinic about a month ago, states she was given a toradol shot. She does not want any further imaging or interventions for her knee at this time. BP elevated in 140s today, was previously on amlodipine when she was seeing her previous PCP but this was stopped. Patient attributes her BP today to pain from her knee and smoking cigarettes. She does not want to restart any antihypertensives at this time. She is agreeable to returning for a nurse visit in 3 weeks and starting medications if blood pressure is still elevated at that time. States she was on inhalers in the past for undocumeted copd but stopped taking them and does not need them; only needs her cpap at night for TIMUR. Declined all vaccines. Agreeable to cologuard but decline all other age recommended HM screening today. All question answered, Adjusting levothyroxine dosage today and rechecking labs in 6 weeks.     1/12/23: Patient presents today for ER follow up. She was seen at [facility] ED on 12/12/22 with complaints of chronic bilateral knee pain R worse than L. She continues to see pain management but states that they have not really been helping with her pain. She states that pain was previously tolerable and was being managed by pain clinic however over the past 2 months, has been worsening, causing 8-9/10 pain when ambulating. Needs a knee brace on her left knee and rolling walker to ambulate and states she is unable to sleep d/t pain. Will repeat xrs today and refer to ortho. Dosage for levothyroixine was  adjusted on her last ov but she did not get repeat lab work done. Ordering thyroid studies today. Continues to decline immunization and HM screening. Did not submit her cologuard and states she does not really want to do it    5/25/23: Patient presents today still with complaints of chronic bilateral knee pain R>L. X-rays were repeated on her last visit which revealed degenerative changes that were greater in right medial compartment. She has been seeing an outside orthopedic surgeon as she is under [employer] comp and states she was told they will attempt rehab and if this does not work, they will try shots but no plans for surgical intervention at this time. States she is unable to go for a 2nd opinion d/t [employer] comp technicalities. She is seeing a pain management specialist and has a prescription for ketorolac and buprenorphine which she states she has not had a chance to fill yet. No other complaints a t this time. Continues to decline further HM screening     9/14/23: Presents today with worsening knee pain. She is now wheelchair bound for most of the day due to pain. Continues ff up with Ortho, states rehab is planned to begin soon to get her standing again. She was told she is not a good candidate for surgery at this time due to muscle breakdown from lack of use. She has also stopped going to pain management through [employer] comp as she does not feel like this is helping. Advised to work with rehab so surgery may be considered in the future if she gets her strength back. Have also advised to reschedule appointment with pain management, as she will be able to work better with rehab if she has some pain control. Verbalized understanding. Refill of thyroid medication erquested. Continues to decline further HM screening    Review of Systems:  10 point ROS negative except for HPI    Objective:   Vital Signs:    Vitals:    09/14/23 1030   BP: 124/76   Pulse: 85   Resp: 18   Temp: 98 °F (36.7 °C)           General:  Well developed, well nourished, no acute respiratory distress  Head: Normocephalic, atraumatic  Eyes: PERRL, EOMI, anicteric sclera  Throat: No posterior pharyngeal erythema or exudate, no tonsillar exudate  Neck: supple, normal ROM, no thyromegaly   CVS:  RRR, S1 and S2 normal, no murmurs, no added heart sounds, rubs, gallops, 2+ peripheral pulses  Resp:  Lungs clear to auscultation bilaterally, no wheezes, rales, or rhonci  GI:  Abdomen soft, non-tender, non-distended, normoactive bowel sounds  MSK:  No muscle atrophy, cyanosis, peripheral edema, limited ROM due to pain, L knee in brace.   Skin:  No rashes, ulcers, erythema  Neuro:  Alert and oriented x3, No focal neuro deficits, CNII-XII grossly intact  Psych:  Appropriate mood and affect     Laboratory:  Lab Results   Component Value Date    WBC 8.1 04/23/2023    HGB 13.1 04/23/2023    HCT 41.9 04/23/2023     04/23/2023    MCV 97.2 (H) 04/23/2023    RDW 13.8 04/23/2023    Lab Results   Component Value Date     04/23/2023    K 4.4 04/23/2023    CO2 22 (L) 04/23/2023    BUN 18.0 04/23/2023    CREATININE 0.85 04/23/2023    CALCIUM 9.6 04/23/2023      Lab Results   Component Value Date    HGBA1C 5.8 07/15/2021    .8 07/15/2021    CREATININE 0.85 04/23/2023    Lab Results   Component Value Date    TSH 2.716 01/12/2023    [id number] 0.96 01/12/2023                  Current Medications:  Current Outpatient Medications   Medication Instructions    buprenorphine 5 mcg/hour (BUTRANS) weekly patch 1 patch, Transdermal, Weekly    furosemide (LASIX) 40 mg, Oral, Daily    gabapentin (NEURONTIN) 100 mg, Oral, 3 times daily    levothyroxine (SYNTHROID) 100 mcg, Oral, Daily    methylPREDNISolone (MEDROL DOSEPACK) 4 mg tablet Take as package instructs        Assessment and Plan:       1. Hypothyroidism  -Continue levothyroxine 100 mcg   -TSH checked 1/2023 within normal limits    2. Hypertension  -Per previous office clinic note of Dr. Schmitt,  BP was controlled. Was found to have elevated BP on last OV but did not want to restart medications  _Bp today: 124/76    3. COPD-undocumented  -Patient reports that she self- stopped all inhalers that she was taking previously and has had no issues; no wheezing, cough, increased sputum, increased SOB at this time     4. TIMUR  -continue home CPAP     5. Tobacco Use  -quit smoking 1.5 weeks ago      6. Hyperlipidemia  -continue home atorvastatin     7. Chronic Knee Pain  8. B/L Osteoarthritis knees  -follows with a pain management clinic  -XR done 1/2023 showed more prominent degenrative changes of R.   -follows with outside ortho, states plans for rehab and possible injections if rehab is not helpful with pain   -advised to reschedule appointment with pain management so she can better participate in rehab    Health Maintenance/ Wellness  Pneumococcal vaccine: UTD  TDAP: Declined  Influenza vaccine: Declined  Shingles: Declined  Mammogram: Last done on 6/2021_, results benign_(BIRADS 2), declined further screening  Screening colonoscopy : cologuard ordered at last appt, patient states shehas decided not to submit  Lung Cancer Screening: benign, 2022 repeat 2023- refused   Hepatitis Panel: negative 2022    Counseling    - Patient counselled on smoking cessation  - Educated on diet (portion control) and exercise (at least 30 minutes per day)  - Relevant educational materials provided    Medications: reconciled, discussed  and refills given.  RTC in 4 months      Ashley Cerna MD  Internal Medicine, PGY-3

## 2023-09-14 ENCOUNTER — OFFICE VISIT (OUTPATIENT)
Dept: INTERNAL MEDICINE | Facility: CLINIC | Age: 63
End: 2023-09-14
Payer: MEDICARE

## 2023-09-14 VITALS
HEIGHT: 64 IN | HEART RATE: 85 BPM | BODY MASS INDEX: 32.44 KG/M2 | RESPIRATION RATE: 18 BRPM | TEMPERATURE: 98 F | SYSTOLIC BLOOD PRESSURE: 124 MMHG | OXYGEN SATURATION: 96 % | DIASTOLIC BLOOD PRESSURE: 76 MMHG | WEIGHT: 190 LBS

## 2023-09-14 DIAGNOSIS — Z72.0 TOBACCO USE: ICD-10-CM

## 2023-09-14 DIAGNOSIS — E03.9 HYPOTHYROIDISM, UNSPECIFIED TYPE: ICD-10-CM

## 2023-09-14 DIAGNOSIS — J44.9 CHRONIC OBSTRUCTIVE PULMONARY DISEASE, UNSPECIFIED COPD TYPE: ICD-10-CM

## 2023-09-14 DIAGNOSIS — E78.5 HYPERLIPIDEMIA, UNSPECIFIED HYPERLIPIDEMIA TYPE: ICD-10-CM

## 2023-09-14 DIAGNOSIS — M17.0 OSTEOARTHRITIS OF BOTH KNEES, UNSPECIFIED OSTEOARTHRITIS TYPE: Primary | ICD-10-CM

## 2023-09-14 PROCEDURE — 99214 OFFICE O/P EST MOD 30 MIN: CPT | Mod: PBBFAC | Performed by: STUDENT IN AN ORGANIZED HEALTH CARE EDUCATION/TRAINING PROGRAM

## 2023-09-14 RX ORDER — LEVOTHYROXINE SODIUM 100 UG/1
100 TABLET ORAL DAILY
Qty: 30 TABLET | Refills: 11 | Status: SHIPPED | OUTPATIENT
Start: 2023-09-14 | End: 2024-09-13

## 2024-02-02 ENCOUNTER — TELEPHONE (OUTPATIENT)
Dept: INTERNAL MEDICINE | Facility: CLINIC | Age: 64
End: 2024-02-02

## 2024-03-04 ENCOUNTER — HOSPITAL ENCOUNTER (OUTPATIENT)
Dept: RADIOLOGY | Facility: HOSPITAL | Age: 64
Discharge: HOME | End: 2024-03-04
Attending: INTERNAL MEDICINE
Payer: MEDICARE

## 2024-03-04 DIAGNOSIS — M17.0 OSTEOARTHRITIS OF BOTH KNEES: Primary | ICD-10-CM

## 2024-03-04 DIAGNOSIS — M17.0 OSTEOARTHRITIS OF BOTH KNEES: ICD-10-CM

## 2024-03-04 PROCEDURE — 73560 X-RAY EXAM OF KNEE 1 OR 2: CPT | Mod: TC,LT

## 2024-03-04 PROCEDURE — 73560 X-RAY EXAM OF KNEE 1 OR 2: CPT | Mod: TC,RT

## 2024-03-21 ENCOUNTER — HOSPITAL ENCOUNTER (OUTPATIENT)
Dept: RADIOLOGY | Facility: HOSPITAL | Age: 64
Discharge: HOME | End: 2024-03-21
Attending: INTERNAL MEDICINE
Payer: MEDICARE

## 2024-03-21 DIAGNOSIS — Z78.0 POSTMENOPAUSAL STATUS: ICD-10-CM

## 2024-03-21 DIAGNOSIS — Z12.31 ENCOUNTER FOR SCREENING MAMMOGRAM FOR MALIGNANT NEOPLASM OF BREAST: ICD-10-CM

## 2024-03-21 PROCEDURE — 77067 SCR MAMMO BI INCL CAD: CPT | Mod: 26,52,, | Performed by: RADIOLOGY

## 2024-03-21 PROCEDURE — 77080 DXA BONE DENSITY AXIAL: CPT | Mod: TC

## 2024-03-21 PROCEDURE — 77067 SCR MAMMO BI INCL CAD: CPT | Mod: TC,52

## 2024-03-21 PROCEDURE — 77063 BREAST TOMOSYNTHESIS BI: CPT | Mod: 26,52,, | Performed by: RADIOLOGY

## 2024-04-22 ENCOUNTER — HOSPITAL ENCOUNTER (OUTPATIENT)
Dept: RADIOLOGY | Facility: HOSPITAL | Age: 64
Discharge: HOME | End: 2024-04-22
Attending: INTERNAL MEDICINE
Payer: MEDICARE

## 2024-04-22 DIAGNOSIS — R92.8 ABNORMAL MAMMOGRAM: ICD-10-CM

## 2024-04-22 PROCEDURE — 77061 BREAST TOMOSYNTHESIS UNI: CPT | Mod: TC,LT

## 2024-04-22 PROCEDURE — 77061 BREAST TOMOSYNTHESIS UNI: CPT | Mod: 26,LT,, | Performed by: STUDENT IN AN ORGANIZED HEALTH CARE EDUCATION/TRAINING PROGRAM

## 2024-04-22 PROCEDURE — 76642 ULTRASOUND BREAST LIMITED: CPT | Mod: 26,LT,, | Performed by: STUDENT IN AN ORGANIZED HEALTH CARE EDUCATION/TRAINING PROGRAM

## 2024-04-22 PROCEDURE — 76642 ULTRASOUND BREAST LIMITED: CPT | Mod: TC,LT

## 2024-04-22 PROCEDURE — 77065 DX MAMMO INCL CAD UNI: CPT | Mod: TC,LT

## 2024-04-22 PROCEDURE — 77065 DX MAMMO INCL CAD UNI: CPT | Mod: 26,LT,, | Performed by: STUDENT IN AN ORGANIZED HEALTH CARE EDUCATION/TRAINING PROGRAM

## 2024-11-19 DIAGNOSIS — Z87.891 HISTORY OF TOBACCO USE: Primary | ICD-10-CM

## 2025-01-07 ENCOUNTER — HOSPITAL ENCOUNTER (EMERGENCY)
Facility: HOSPITAL | Age: 65
Discharge: HOME | End: 2025-01-07
Attending: STUDENT IN AN ORGANIZED HEALTH CARE EDUCATION/TRAINING PROGRAM
Payer: MEDICARE

## 2025-01-07 VITALS
HEART RATE: 82 BPM | SYSTOLIC BLOOD PRESSURE: 137 MMHG | BODY MASS INDEX: 35.85 KG/M2 | TEMPERATURE: 98 F | WEIGHT: 210 LBS | HEIGHT: 64 IN | OXYGEN SATURATION: 91 % | RESPIRATION RATE: 22 BRPM | DIASTOLIC BLOOD PRESSURE: 78 MMHG

## 2025-01-07 DIAGNOSIS — I87.2 CHRONIC VENOUS INSUFFICIENCY OF LOWER EXTREMITY: Primary | ICD-10-CM

## 2025-01-07 DIAGNOSIS — I87.2 VENOUS STASIS DERMATITIS OF BOTH LOWER EXTREMITIES: ICD-10-CM

## 2025-01-07 LAB
ALBUMIN SERPL-MCNC: 3 G/DL (ref 3.4–4.8)
ALBUMIN/GLOB SERPL: 0.6 RATIO (ref 1.1–2)
ALP SERPL-CCNC: 103 UNIT/L (ref 40–150)
ALT SERPL-CCNC: 11 UNIT/L (ref 0–55)
ANION GAP SERPL CALC-SCNC: 7 MEQ/L
AST SERPL-CCNC: 11 UNIT/L (ref 5–34)
BASOPHILS # BLD AUTO: 0.06 X10(3)/MCL
BASOPHILS NFR BLD AUTO: 0.6 %
BILIRUB SERPL-MCNC: 0.4 MG/DL
BNP BLD-MCNC: 25.1 PG/ML
BUN SERPL-MCNC: 12 MG/DL (ref 9.8–20.1)
CALCIUM SERPL-MCNC: 10 MG/DL (ref 8.4–10.2)
CHLORIDE SERPL-SCNC: 107 MMOL/L (ref 98–107)
CO2 SERPL-SCNC: 26 MMOL/L (ref 23–31)
CREAT SERPL-MCNC: 0.73 MG/DL (ref 0.55–1.02)
CREAT/UREA NIT SERPL: 16
EOSINOPHIL # BLD AUTO: 0.28 X10(3)/MCL (ref 0–0.9)
EOSINOPHIL NFR BLD AUTO: 2.8 %
ERYTHROCYTE [DISTWIDTH] IN BLOOD BY AUTOMATED COUNT: 14.6 % (ref 11.5–17)
GFR SERPLBLD CREATININE-BSD FMLA CKD-EPI: >60 ML/MIN/1.73/M2
GLOBULIN SER-MCNC: 4.8 GM/DL (ref 2.4–3.5)
GLUCOSE SERPL-MCNC: 108 MG/DL (ref 82–115)
HCT VFR BLD AUTO: 47.1 % (ref 37–47)
HGB BLD-MCNC: 14.4 G/DL (ref 12–16)
IMM GRANULOCYTES # BLD AUTO: 0.02 X10(3)/MCL (ref 0–0.04)
IMM GRANULOCYTES NFR BLD AUTO: 0.2 %
LYMPHOCYTES # BLD AUTO: 1.94 X10(3)/MCL (ref 0.6–4.6)
LYMPHOCYTES NFR BLD AUTO: 19.4 %
MCH RBC QN AUTO: 30.6 PG (ref 27–31)
MCHC RBC AUTO-ENTMCNC: 30.6 G/DL (ref 33–36)
MCV RBC AUTO: 100 FL (ref 80–94)
MONOCYTES # BLD AUTO: 0.98 X10(3)/MCL (ref 0.1–1.3)
MONOCYTES NFR BLD AUTO: 9.8 %
NEUTROPHILS # BLD AUTO: 6.71 X10(3)/MCL (ref 2.1–9.2)
NEUTROPHILS NFR BLD AUTO: 67.2 %
PLATELET # BLD AUTO: 313 X10(3)/MCL (ref 130–400)
PMV BLD AUTO: 9.9 FL (ref 7.4–10.4)
POTASSIUM SERPL-SCNC: 4.1 MMOL/L (ref 3.5–5.1)
PROT SERPL-MCNC: 7.8 GM/DL (ref 5.8–7.6)
RBC # BLD AUTO: 4.71 X10(6)/MCL (ref 4.2–5.4)
SODIUM SERPL-SCNC: 140 MMOL/L (ref 136–145)
WBC # BLD AUTO: 9.99 X10(3)/MCL (ref 4.5–11.5)

## 2025-01-07 PROCEDURE — 99283 EMERGENCY DEPT VISIT LOW MDM: CPT

## 2025-01-07 PROCEDURE — 80053 COMPREHEN METABOLIC PANEL: CPT | Performed by: EMERGENCY MEDICINE

## 2025-01-07 PROCEDURE — 83880 ASSAY OF NATRIURETIC PEPTIDE: CPT | Performed by: EMERGENCY MEDICINE

## 2025-01-07 PROCEDURE — 85025 COMPLETE CBC W/AUTO DIFF WBC: CPT | Performed by: EMERGENCY MEDICINE

## 2025-01-07 RX ORDER — HYDROCHLOROTHIAZIDE 12.5 MG/1
12.5 TABLET ORAL EVERY MORNING
COMMUNITY
Start: 2024-11-15

## 2025-01-07 RX ORDER — CEPHALEXIN 500 MG/1
500 CAPSULE ORAL 4 TIMES DAILY
Qty: 28 CAPSULE | Refills: 0 | Status: SHIPPED | OUTPATIENT
Start: 2025-01-07 | End: 2025-01-14

## 2025-01-07 NOTE — ED PROVIDER NOTES
Encounter Date: 2025       History     Chief Complaint   Patient presents with    Leg Swelling     Patient presents with c/o BLE edema with weeping.       62 year old female with a past medical history of COPD, hypertension, hypothyroidism, tobacco use, sleep apnea, hyperlipidemia, and morbid obesity presents to the emergency room with complaints of deteriorating skin on both lower extremities.  She states she has obstructive sleep apnea and does not have a good masked and thus she falls asleep while sitting in her wheelchair which has made her leg swelling worse.  She now has weeping from the wounds.  The patient's son states that her dog licks her wounds as well.  Last Internal Medicine visit appears to have been in 2023.  She also has chronic knee pain.  She was in pain management on buprenorphine for the last filled in May of 2023.       Review of patient's allergies indicates:   Allergen Reactions    Hydromorphone Shortness Of Breath     Other reaction(s): sob, smothering sensation    Methadone Shortness Of Breath     Other reaction(s): methadone, sob, tachycardia    Morphine Shortness Of Breath     Other reaction(s): morphine, sob, tachycardia    Sulfamethoxazole Itching     Other reaction(s): unknown, childhood allergy    Codeine Palpitations     Other reaction(s): childhood, unknown    Meperidine Nausea Only     Other reaction(s): nausea    Oxycodone-aspirin Palpitations     Other reaction(s): palpitations     Past Medical History:   Diagnosis Date    Allergy     Arthritis     Asthma      No past surgical history on file.  No family history on file.  Social History     Tobacco Use    Smoking status: Former     Current packs/day: 0.00     Average packs/day: 0.5 packs/day for 50.0 years (25.0 ttl pk-yrs)     Types: Cigarettes     Start date: 1973     Quit date: 2023     Years since quittin.6    Smokeless tobacco: Never   Substance Use Topics    Alcohol use: Not Currently    Drug  use: Never     Review of Systems   Constitutional:  Negative for fever.   HENT:  Negative for sore throat.    Respiratory:  Negative for shortness of breath.    Cardiovascular:  Negative for chest pain.   Gastrointestinal:  Negative for nausea.   Genitourinary:  Negative for dysuria.   Musculoskeletal:  Negative for back pain.   Skin:  Positive for color change, rash and wound.   Neurological:  Negative for weakness.   Hematological:  Does not bruise/bleed easily.       Physical Exam     Initial Vitals [01/07/25 1450]   BP Pulse Resp Temp SpO2   137/78 82 (!) 22 98.1 °F (36.7 °C) (!) 91 %      MAP       --         Physical Exam    Nursing note and vitals reviewed.  Constitutional: She appears well-developed and well-nourished.   HENT:   Head: Normocephalic and atraumatic.   Eyes: Conjunctivae and EOM are normal. Pupils are equal, round, and reactive to light.   Neck: Neck supple.   Normal range of motion.  Cardiovascular:  Normal rate, regular rhythm, normal heart sounds and intact distal pulses.           Pulmonary/Chest: Breath sounds normal.   Abdominal: Abdomen is soft. Bowel sounds are normal.   Musculoskeletal:         General: Normal range of motion.      Cervical back: Normal range of motion and neck supple.     Neurological: She is alert and oriented to person, place, and time. She has normal strength.   Skin: Skin is warm and dry. Capillary refill takes less than 2 seconds. Rash noted. There is erythema.   Chronic skin changes   Psychiatric: She has a normal mood and affect. Her behavior is normal. Judgment and thought content normal.         ED Course   Procedures  Labs Reviewed   COMPREHENSIVE METABOLIC PANEL - Abnormal       Result Value    Sodium 140      Potassium 4.1      Chloride 107      CO2 26      Glucose 108      Blood Urea Nitrogen 12.0      Creatinine 0.73      Calcium 10.0      Protein Total 7.8 (*)     Albumin 3.0 (*)     Globulin 4.8 (*)     Albumin/Globulin Ratio 0.6 (*)     Bilirubin  Total 0.4            ALT 11      AST 11      eGFR >60      Anion Gap 7.0      BUN/Creatinine Ratio 16     CBC WITH DIFFERENTIAL - Abnormal    WBC 9.99      RBC 4.71      Hgb 14.4      Hct 47.1 (*)     .0 (*)     MCH 30.6      MCHC 30.6 (*)     RDW 14.6      Platelet 313      MPV 9.9      Neut % 67.2      Lymph % 19.4      Mono % 9.8      Eos % 2.8      Basophil % 0.6      Imm Grans % 0.2      Neut # 6.71      Lymph # 1.94      Mono # 0.98      Eos # 0.28      Baso # 0.06      Imm Gran # 0.02     B-TYPE NATRIURETIC PEPTIDE - Normal    Natriuretic Peptide 25.1     CBC W/ AUTO DIFFERENTIAL    Narrative:     The following orders were created for panel order CBC auto differential.  Procedure                               Abnormality         Status                     ---------                               -----------         ------                     CBC with Differential[[id number]]       Abnormal            Final result                 Please view results for these tests on the individual orders.          Imaging Results    None          Medications - No data to display  Medical Decision Making  Amount and/or Complexity of Data Reviewed  Labs: ordered.                                      Clinical Impression:  Final diagnoses:  [I87.2] Chronic venous insufficiency of lower extremity (Primary)  [I87.2] Venous stasis dermatitis of both lower extremities          ED Disposition Condition    Discharge Stable          ED Prescriptions       Medication Sig Dispense Start Date End Date Auth. Provider    cephALEXin (KEFLEX) 500 MG capsule Take 1 capsule (500 mg total) by mouth 4 (four) times daily. for 7 days 28 capsule 1/7/2025 1/14/2025 Niall Zimmerman MD          Follow-up Information       Follow up With Specialties Details Why Contact Info    Follow up in the wound clinic on monday or tuesday  Go in 3 days               Niall Zimmerman MD  01/07/25 9409

## 2025-01-15 ENCOUNTER — HOSPITAL ENCOUNTER (OUTPATIENT)
Dept: RADIOLOGY | Facility: HOSPITAL | Age: 65
Discharge: HOME | End: 2025-01-15
Attending: INTERNAL MEDICINE
Payer: MEDICARE

## 2025-01-15 ENCOUNTER — HOSPITAL ENCOUNTER (OUTPATIENT)
Dept: WOUND CARE | Facility: HOSPITAL | Age: 65
Discharge: HOME | End: 2025-01-15
Attending: PEDIATRICS
Payer: MEDICARE

## 2025-01-15 VITALS
OXYGEN SATURATION: 92 % | DIASTOLIC BLOOD PRESSURE: 83 MMHG | HEART RATE: 90 BPM | SYSTOLIC BLOOD PRESSURE: 146 MMHG | RESPIRATION RATE: 20 BRPM | TEMPERATURE: 98 F

## 2025-01-15 DIAGNOSIS — L97.211 VENOUS STASIS ULCER OF RIGHT CALF LIMITED TO BREAKDOWN OF SKIN, UNSPECIFIED WHETHER VARICOSE VEINS PRESENT: ICD-10-CM

## 2025-01-15 DIAGNOSIS — Z87.891 HISTORY OF TOBACCO USE: ICD-10-CM

## 2025-01-15 DIAGNOSIS — L97.221 VENOUS STASIS ULCER OF LEFT CALF LIMITED TO BREAKDOWN OF SKIN, UNSPECIFIED WHETHER VARICOSE VEINS PRESENT: ICD-10-CM

## 2025-01-15 DIAGNOSIS — I87.2 VENOUS INSUFFICIENCY OF BOTH LOWER EXTREMITIES: Primary | ICD-10-CM

## 2025-01-15 DIAGNOSIS — I83.022 VENOUS STASIS ULCER OF LEFT CALF LIMITED TO BREAKDOWN OF SKIN, UNSPECIFIED WHETHER VARICOSE VEINS PRESENT: ICD-10-CM

## 2025-01-15 DIAGNOSIS — I83.012 VENOUS STASIS ULCER OF RIGHT CALF LIMITED TO BREAKDOWN OF SKIN, UNSPECIFIED WHETHER VARICOSE VEINS PRESENT: ICD-10-CM

## 2025-01-15 PROCEDURE — 27000999 HC MEDICAL RECORD PHOTO DOCUMENTATION

## 2025-01-15 PROCEDURE — 71271 CT THORAX LUNG CANCER SCR C-: CPT | Mod: TC

## 2025-01-15 PROCEDURE — 99213 OFFICE O/P EST LOW 20 MIN: CPT | Mod: 25

## 2025-01-15 PROCEDURE — 99203 OFFICE O/P NEW LOW 30 MIN: CPT | Mod: ,,, | Performed by: PEDIATRICS

## 2025-01-15 PROCEDURE — 87077 CULTURE AEROBIC IDENTIFY: CPT

## 2025-01-15 NOTE — PATIENT INSTRUCTIONS
Cleanse wound with Dakins 0.125 strength  Pat dry  Primary dressing:apply z guard paste to open areas  Secondary dressing: cover with Mextra or optilock, wrap with kerlix, and secure with tape  Frequency: every other day    Edema control:   Apply Tubigrip G for edema control - Apply from base of toes to below knee  Apply first thing in the morning when legs are the smallest, may remove for sleeping at night.  Elevate lower legs when resting for additional edema control    If compression isn't performed, blistering and new ulcerations  may / will occur.        A wound culture was obtained at today's visit. Continue taking Keflex as prescribed. Antibiotics may be changed if needed when final culture results are obtained.        Other orders: Admit to home health    Follow-up: 1 week in wound care clinic  Do not leave open to air

## 2025-01-15 NOTE — PROGRESS NOTES
Subjective:       Patient ID: Mabel Weber is a 64 y.o. female.    Chief Complaint: Wound Consult (Seen in ED 1/7/25 for chronic venous insufficiency and venous stasis dermatitis. Patient was prescribed Keflex (reports taking as prescribed) and referred to wound care clinic. Patient reports that she does not have a cardiologist and has never been seen by vascular. Here for evaluation and treatment with MD. )    HPI  Review of Systems      Objective:      Temp:  [98.3 °F (36.8 °C)]   Pulse:  [90]   Resp:  [20]   BP: (146)/(83)   SpO2:  [92 %]   Physical Exam       Wound 01/15/25 1343 Venous Ulcer Left lower;posterior Leg (Active)   01/15/25 1343 Leg   Present on Original Admission: Y   Primary Wound Type: Venous ulcer   Side: Left   Orientation: lower;posterior   Wound Approximate Age at First Assessment (Weeks):    Wound Number:    Is this injury device related?:    Incision Type:    Closure Method:    Wound Description (Comments):    Type:    Additional Comments:    Ankle-Brachial Index:    Pulses: DP 2+ palpable, PT 1+ palpable, strong doppler signals to both   Removal Indication and Assessment:    Wound Outcome:    Wound Image    01/15/25 1345   Dressing Appearance Intact;Moist drainage 01/15/25 1345   Drainage Amount Moderate 01/15/25 1345   Drainage Characteristics/Odor Green;Yellow 01/15/25 1345   Appearance Pink;Not granulating;Tan;Wet;Fibrin;Red 01/15/25 1345   Tissue loss description Partial thickness 01/15/25 1345   Red (%), Wound Tissue Color 60 % 01/15/25 1345   Periwound Area Hemosiderin Staining;Denuded;Edematous;Redness 01/15/25 1345   Wound Edges Irregular 01/15/25 1345   Wound Length (cm) 11 cm 01/15/25 1345   Wound Width (cm) 15 cm 01/15/25 1345   Wound Depth (cm) 0.1 cm 01/15/25 1345   Wound Volume (cm^3) 16.5 cm^3 01/15/25 1345   Wound Surface Area (cm^2) 165 cm^2 01/15/25 1345   Care Cleansed with:;Soap and water;Other (see comments) 01/15/25 1345   Dressing Applied;Non-adherent;Absorptive  Pad;Rolled gauze;Tubular bandage 01/15/25 1345   Periwound Care Dry periwound area maintained 01/15/25 1345   Compression Tubular elasticized bandage 01/15/25 1345            Wound 01/15/25 1345 Venous Ulcer Right lower;posterior Leg (Active)   01/15/25 1345 Leg   Present on Original Admission: Y   Primary Wound Type: Venous ulcer   Side: Right   Orientation: lower;posterior   Wound Approximate Age at First Assessment (Weeks):    Wound Number:    Is this injury device related?:    Incision Type:    Closure Method:    Wound Description (Comments):    Type:    Additional Comments:    Ankle-Brachial Index:    Pulses: DP 2+ palpable, PT 1+ palpable, strong doppler signals to both   Removal Indication and Assessment:    Wound Outcome:    Wound Image    01/15/25 1345   Dressing Appearance Intact;Moist drainage 01/15/25 1345   Drainage Amount Moderate 01/15/25 1345   Drainage Characteristics/Odor Green;Yellow 01/15/25 1345   Appearance Pink;Red;Not granulating;Tan;Moist;Fibrin 01/15/25 1345   Tissue loss description Partial thickness 01/15/25 1345   Red (%), Wound Tissue Color 75 % 01/15/25 1345   Periwound Area Denuded;Edematous;Hemosiderin Staining;Moist;Other (see comments);Redness 01/15/25 1345   Wound Edges Irregular 01/15/25 1345   Wound Length (cm) 12 cm 01/15/25 1345   Wound Width (cm) 14 cm 01/15/25 1345   Wound Depth (cm) 0.1 cm 01/15/25 1345   Wound Volume (cm^3) 16.8 cm^3 01/15/25 1345   Wound Surface Area (cm^2) 168 cm^2 01/15/25 1345   Care Cleansed with:;Antimicrobial agent;Wound cleanser;Other (see comments);Applied: 01/15/25 1345   Dressing Applied;Non-adherent;Absorptive Pad;Rolled gauze;Tubular bandage 01/15/25 1345   Periwound Care Dry periwound area maintained 01/15/25 1345   Compression Tubular elasticized bandage 01/15/25 1345         Assessment:     Patient here today after being seen in the emergency room on 01/07/2025.  Patient has had chronic venous insufficiency and venous stasis dermatitis.   Patient was prescribed Keflex and was referred to wound care.  Patient has not been seen by a vascular surgeon or cardiologist in the past.  Today venous ulcer of left lower leg posterior is 11 cm x 15 cm with a depth of 0.1 cm this is pink and denuded.  This appears to be partial-thickness.  There is some hemosiderin staining.  Area was cleaned and wound culture taken.  This was cleaned with soap and water.  Large amount of crust was removed.  Z guard paste was applied.  This was covered with absorptive pads.  Right lower leg with area of 12 cm x 14 cm with a depth of 0.1 cm this areas denuded and there is some hemosiderin staining.  This is not granulated.  Absorptive pads were applied and secured with tape and Tubigrip G was used for edema control.  Again dressings will be changed every other day and patient will return in 1 week for MD visit.  Doppler pulses were present in both legs.    ICD-10-CM ICD-9-CM   1. Venous insufficiency of both lower extremities  I87.2 459.81   2. Venous stasis ulcer of left calf limited to breakdown of skin, unspecified whether varicose veins present  I83.022 454.0    L97.221    3. Venous stasis ulcer of right calf limited to breakdown of skin, unspecified whether varicose veins present  I83.012 454.0    L97.211          Plan:   Tissue pathology and/or culture taken:  [x] Yes [] No   Sharp debridement performed:   [] Yes [x] No   Labs ordered this visit:   [] Yes [x] No   Imaging ordered this visit:   [] Yes [x] No           Orders Placed This Encounter   Procedures    Wound Culture    Change dressing     Cleanse wound with Dakins 0.125 strength  Pat dry  Primary dressing:apply z guard paste to open areas  Secondary dressing: cover with Mextra or optilock, wrap with kerlix, and secure with tape  Frequency: every other day    Edema control:   Apply Tubigrip G for edema control - Apply from base of toes to below knee  Apply first thing in the morning when legs are the smallest, may  remove for sleeping at night.  Elevate lower legs when resting for additional edema control    If compression isn't performed, blistering and new ulcerations  may / will occur.        A wound culture was obtained at today's visit. Continue taking Keflex as prescribed. Antibiotics may be changed if needed when final culture results are obtained.        Other orders: Admit to home health    Follow-up: 1 week in wound care clinic  Do not leave open to air        Follow up in about 1 week (around 1/22/2025) for md visit .

## 2025-01-18 LAB
BACTERIA WND CULT: ABNORMAL
BACTERIA WND CULT: ABNORMAL

## 2025-01-24 RX ORDER — LEVOFLOXACIN 500 MG/1
500 TABLET, FILM COATED ORAL DAILY
Qty: 10 TABLET | Refills: 0 | Status: SHIPPED | OUTPATIENT
Start: 2025-01-24 | End: 2025-02-03

## 2025-01-24 RX ORDER — AMOXICILLIN 500 MG/1
500 CAPSULE ORAL 3 TIMES DAILY
Qty: 30 CAPSULE | Refills: 0 | Status: SHIPPED | OUTPATIENT
Start: 2025-01-24 | End: 2025-02-03

## 2025-01-24 NOTE — PROGRESS NOTES
Culture results received.  Results called to Dr. Khalil.  Pt has completed previous course of Keflex.   New orders obtained for Amoxil 500mg po 1 tab 3 times daily x 10 days and Levaquin 500mg po 1 tab daily x 10 days.   Sent electronically to pt's pharmacy.  Notified pt per telephone of new prescription orders.  Verbalized understanding.   Reports HH just come came and changed her dressings and they are looking good.

## 2025-01-28 NOTE — PATIENT INSTRUCTIONS
Bilateral lower legs  Cleanse wound with Dakins 0.125 strength  Pat dry  Primary dressing: apply z guard paste to open areas and a thin layer of moisturizer (skin cream) to lower legs for dryness  Ok to use mextra or optilock if draining occurs   Secondary dressing: wrap with kerlix, and secure with tape  Frequency: every other day     Edema control:   Apply Tubigrip G for edema control - Apply from base of toes to below knee  Apply first thing in the morning when legs are the smallest, may remove for sleeping at night.  Elevate lower legs when resting for additional edema control     If compression isn't performed, blistering and new ulcerations  may / will occur.         Continue taking antibiotics as prescribed    Do not leave open to air     Home health: [facility]     Follow-up: 2 weeks in wound care clinic

## 2025-01-29 ENCOUNTER — HOSPITAL ENCOUNTER (OUTPATIENT)
Dept: WOUND CARE | Facility: HOSPITAL | Age: 65
Discharge: HOME | End: 2025-01-29
Attending: PEDIATRICS
Payer: MEDICARE

## 2025-01-29 VITALS
OXYGEN SATURATION: 92 % | TEMPERATURE: 99 F | DIASTOLIC BLOOD PRESSURE: 78 MMHG | HEART RATE: 101 BPM | SYSTOLIC BLOOD PRESSURE: 136 MMHG

## 2025-01-29 DIAGNOSIS — L97.221 VENOUS STASIS ULCER OF LEFT CALF LIMITED TO BREAKDOWN OF SKIN, UNSPECIFIED WHETHER VARICOSE VEINS PRESENT: ICD-10-CM

## 2025-01-29 DIAGNOSIS — L97.211 VENOUS STASIS ULCER OF RIGHT CALF LIMITED TO BREAKDOWN OF SKIN, UNSPECIFIED WHETHER VARICOSE VEINS PRESENT: ICD-10-CM

## 2025-01-29 DIAGNOSIS — I87.2 VENOUS INSUFFICIENCY OF BOTH LOWER EXTREMITIES: Primary | ICD-10-CM

## 2025-01-29 DIAGNOSIS — I83.012 VENOUS STASIS ULCER OF RIGHT CALF LIMITED TO BREAKDOWN OF SKIN, UNSPECIFIED WHETHER VARICOSE VEINS PRESENT: ICD-10-CM

## 2025-01-29 DIAGNOSIS — I83.022 VENOUS STASIS ULCER OF LEFT CALF LIMITED TO BREAKDOWN OF SKIN, UNSPECIFIED WHETHER VARICOSE VEINS PRESENT: ICD-10-CM

## 2025-01-29 PROCEDURE — 27000999 HC MEDICAL RECORD PHOTO DOCUMENTATION

## 2025-01-29 PROCEDURE — 99213 OFFICE O/P EST LOW 20 MIN: CPT

## 2025-01-29 PROCEDURE — 99213 OFFICE O/P EST LOW 20 MIN: CPT | Mod: ,,, | Performed by: PEDIATRICS

## 2025-01-29 NOTE — PROGRESS NOTES
Subjective:       Patient ID: Mabel Weber is a 64 y.o. female.    Chief Complaint: Venous Ulcer (Bilateral lower extremity venous ulcers. Patient reports taking Amoxicillin and Levaquin as prescribed. Here for re-evaluation and treatment with MD. )    HPI  Review of Systems      Objective:      Temp:  [98.7 °F (37.1 °C)]   Pulse:  [101]   BP: (136)/(78)   SpO2:  [92 %]   Physical Exam       Wound 01/15/25 1343 Venous Ulcer Left lower;posterior Leg (Active)   01/15/25 1343 Leg   Present on Original Admission: Y   Primary Wound Type: Venous ulcer   Side: Left   Orientation: lower;posterior   Wound Approximate Age at First Assessment (Weeks):    Wound Number:    Is this injury device related?:    Incision Type:    Closure Method:    Wound Description (Comments):    Type:    Additional Comments:    Ankle-Brachial Index:    Pulses: DP 2+ palpable, PT 1+ palpable, strong doppler signals to both   Removal Indication and Assessment:    Wound Outcome:    Wound Image     01/29/25 1400   Dressing Appearance Intact;Dry 01/29/25 1400   Drainage Amount None 01/29/25 1400   Appearance Pink;Red;Not granulating;Tan;Fibrin 01/29/25 1400   Tissue loss description Partial thickness 01/29/25 1400   Periwound Area Edematous;Redness;Hemosiderin Staining;Intact 01/29/25 1400   Wound Length (cm) 0 cm 01/29/25 1400   Wound Width (cm) 0 cm 01/29/25 1400   Wound Depth (cm) 0 cm 01/29/25 1400   Wound Volume (cm^3) 0 cm^3 01/29/25 1400   Wound Surface Area (cm^2) 0 cm^2 01/29/25 1400   Care Cleansed with:;Soap and water;Antimicrobial agent;Wound cleanser 01/29/25 1400   Dressing Applied;Other (comment);Rolled gauze 01/29/25 1400   Periwound Care Moisturizer applied 01/29/25 1400   Compression Tubular elasticized bandage 01/29/25 1400            Wound 01/15/25 1345 Venous Ulcer Right lower;posterior Leg (Active)   01/15/25 1345 Leg   Present on Original Admission: Y   Primary Wound Type: Venous ulcer   Side: Right   Orientation:  lower;posterior   Wound Approximate Age at First Assessment (Weeks):    Wound Number:    Is this injury device related?:    Incision Type:    Closure Method:    Wound Description (Comments):    Type:    Additional Comments:    Ankle-Brachial Index:    Pulses: DP 2+ palpable, PT 1+ palpable, strong doppler signals to both   Removal Indication and Assessment:    Wound Outcome:    Wound Image     01/29/25 1400   Dressing Appearance Intact;Moist drainage 01/29/25 1400   Drainage Amount Scant 01/29/25 1400   Drainage Characteristics/Odor Serous 01/29/25 1400   Appearance Red;Pink;Not granulating;Tan;Fibrin 01/29/25 1400   Tissue loss description Partial thickness 01/29/25 1400   Periwound Area Edematous;Hemosiderin Staining 01/29/25 1400   Wound Edges Defined 01/29/25 1400   Wound Length (cm) 3 cm 01/29/25 1400   Wound Width (cm) 2 cm 01/29/25 1400   Wound Depth (cm) 0.1 cm 01/29/25 1400   Wound Volume (cm^3) 0.6 cm^3 01/29/25 1400   Wound Surface Area (cm^2) 6 cm^2 01/29/25 1400   Care Cleansed with:;Soap and water;Antimicrobial agent;Wound cleanser 01/29/25 1400   Dressing Applied;Rolled gauze 01/29/25 1400   Periwound Care Moisturizer applied 01/29/25 1400   Compression Tubular elasticized bandage 01/29/25 1400         Assessment:     Continuing ulcer of the right posterior leg.  3 cm x 2 cm.  This is improving.  No drainage at this time.  This is mildly denuded.  Areas were cleaned and moisturize were applied to the skin absorptive pads for drainage.  This will be cleaned daily.  Tubigrip G was applied for edema control.  Patient will return 2 weeks for MD visit.    ICD-10-CM ICD-9-CM   1. Venous insufficiency of both lower extremities  I87.2 459.81   2. Venous stasis ulcer of left calf limited to breakdown of skin, unspecified whether varicose veins present  I83.022 454.0    L97.221    3. Venous stasis ulcer of right calf limited to breakdown of skin, unspecified whether varicose veins present  I83.012 454.0     L97.211          Plan:   Tissue pathology and/or culture taken:  [] Yes [x] No   Sharp debridement performed:   [] Yes [x] No   Labs ordered this visit:   [] Yes [x] No   Imaging ordered this visit:   [] Yes [x] No           Orders Placed This Encounter   Procedures    Change dressing     Bilateral lower legs  Cleanse wound with Dakins 0.125 strength  Pat dry  Primary dressing: apply z guard paste to open areas and a thin layer of moisturizer (skin cream) to lower legs for dryness  Ok to use mextra or optilock if draining occurs   Secondary dressing: wrap with kerlix, and secure with tape  Frequency: every other day     Edema control:   Apply Tubigrip G for edema control - Apply from base of toes to below knee  Apply first thing in the morning when legs are the smallest, may remove for sleeping at night.  Elevate lower legs when resting for additional edema control     If compression isn't performed, blistering and new ulcerations  may / will occur.         Continue taking antibiotics as prescribed     Do not leave open to air     Home health: [facility]     Follow-up: 2 weeks in wound care clinic        Follow up in about 2 weeks (around 2/12/2025) for MD visit.

## 2025-06-10 DIAGNOSIS — Z12.31 ENCOUNTER FOR SCREENING MAMMOGRAM FOR MALIGNANT NEOPLASM OF BREAST: Primary | ICD-10-CM
